# Patient Record
Sex: FEMALE | Race: BLACK OR AFRICAN AMERICAN | Employment: FULL TIME | URBAN - METROPOLITAN AREA
[De-identification: names, ages, dates, MRNs, and addresses within clinical notes are randomized per-mention and may not be internally consistent; named-entity substitution may affect disease eponyms.]

---

## 2019-11-11 ENCOUNTER — HOSPITAL ENCOUNTER (OUTPATIENT)
Dept: LAB | Age: 49
Discharge: HOME | End: 2019-11-11

## 2019-11-11 PROCEDURE — 86735 MUMPS ANTIBODY: CPT

## 2019-11-11 PROCEDURE — 86765 RUBEOLA ANTIBODY: CPT

## 2019-11-11 PROCEDURE — 86706 HEP B SURFACE ANTIBODY: CPT

## 2019-11-11 PROCEDURE — 86787 VARICELLA-ZOSTER ANTIBODY: CPT

## 2019-11-11 PROCEDURE — 86762 RUBELLA ANTIBODY: CPT

## 2019-11-12 LAB
HBV SURFACE AB SER QL IA: POSITIVE
HBV SURFACE AB SERPL IA-ACNC: 129.76 MIU/ML
HEP BS AB COMMENT,HBSAC: NORMAL
RUBV IGG SER-IMP: NORMAL

## 2019-11-13 LAB
MEV IGG SER IA-ACNC: >300 AU/ML
MUV IGG SER IA-ACNC: 56 AU/ML
VZV IGG SER IA-ACNC: 330 INDEX

## 2020-09-24 ENCOUNTER — EMPLOYEE WELLNESS (OUTPATIENT)
Dept: FAMILY MEDICINE CLINIC | Age: 50
End: 2020-09-24

## 2020-09-24 LAB
CHOLEST SERPL-MCNC: 180 MG/DL
GLUCOSE SERPL-MCNC: 95 MG/DL (ref 74–99)
HDLC SERPL-MCNC: 87 MG/DL (ref 40–60)
LDLC SERPL CALC-MCNC: 78.6 MG/DL (ref 0–100)
TRIGL SERPL-MCNC: 72 MG/DL (ref ?–150)

## 2020-10-08 ENCOUNTER — DOCUMENTATION ONLY (OUTPATIENT)
Dept: PULMONOLOGY | Age: 50
End: 2020-10-08

## 2020-12-03 ENCOUNTER — OFFICE VISIT (OUTPATIENT)
Dept: PULMONOLOGY | Age: 50
End: 2020-12-03
Payer: COMMERCIAL

## 2020-12-03 VITALS
OXYGEN SATURATION: 99 % | TEMPERATURE: 98.2 F | BODY MASS INDEX: 32.02 KG/M2 | SYSTOLIC BLOOD PRESSURE: 130 MMHG | RESPIRATION RATE: 19 BRPM | HEART RATE: 87 BPM | DIASTOLIC BLOOD PRESSURE: 80 MMHG | WEIGHT: 204 LBS | HEIGHT: 67 IN

## 2020-12-03 DIAGNOSIS — J30.9 ALLERGIC RHINITIS, UNSPECIFIED SEASONALITY, UNSPECIFIED TRIGGER: ICD-10-CM

## 2020-12-03 DIAGNOSIS — E66.9 OBESITY (BMI 30.0-34.9): ICD-10-CM

## 2020-12-03 DIAGNOSIS — Z88.6 ALLERGY TO NSAIDS: ICD-10-CM

## 2020-12-03 DIAGNOSIS — J45.30 MILD PERSISTENT ASTHMA WITHOUT COMPLICATION: Primary | ICD-10-CM

## 2020-12-03 PROCEDURE — 99204 OFFICE O/P NEW MOD 45 MIN: CPT | Performed by: INTERNAL MEDICINE

## 2020-12-03 PROCEDURE — 94010 BREATHING CAPACITY TEST: CPT | Performed by: INTERNAL MEDICINE

## 2020-12-03 RX ORDER — FLUTICASONE PROPIONATE AND SALMETEROL 500; 50 UG/1; UG/1
1 POWDER RESPIRATORY (INHALATION) EVERY 12 HOURS
Qty: 3 INHALER | Refills: 3 | Status: SHIPPED | COMMUNITY
Start: 2020-12-03 | End: 2022-04-20 | Stop reason: SDUPTHER

## 2020-12-03 RX ORDER — AZELASTINE 1 MG/ML
SPRAY, METERED NASAL
COMMUNITY
Start: 2020-08-28 | End: 2022-07-21

## 2020-12-03 RX ORDER — AMLODIPINE BESYLATE 10 MG/1
TABLET ORAL DAILY
COMMUNITY
Start: 2020-10-30

## 2020-12-03 RX ORDER — FLUTICASONE PROPIONATE 50 MCG
SPRAY, SUSPENSION (ML) NASAL
COMMUNITY
Start: 2020-08-28 | End: 2021-07-26 | Stop reason: SDUPTHER

## 2020-12-03 RX ORDER — ALBUTEROL SULFATE 90 UG/1
AEROSOL, METERED RESPIRATORY (INHALATION)
COMMUNITY
End: 2022-03-07 | Stop reason: SDUPTHER

## 2020-12-03 RX ORDER — FLUTICASONE PROPIONATE AND SALMETEROL 500; 50 UG/1; UG/1
1 POWDER RESPIRATORY (INHALATION) EVERY 12 HOURS
COMMUNITY
End: 2020-12-03 | Stop reason: SDUPTHER

## 2020-12-03 RX ORDER — LISINOPRIL 20 MG/1
TABLET ORAL DAILY
COMMUNITY
Start: 2020-10-30

## 2020-12-03 RX ORDER — ESTRIOL 100 %
POWDER (GRAM) MISCELLANEOUS
COMMUNITY
End: 2022-07-21

## 2020-12-03 NOTE — PROGRESS NOTES
HISTORY OF PRESENT ILLNESS  Josee Jaime is a 52 y.o. female referred to establish care for Asthma. Pt with long history of Asthma, well controlled on Wixela. She presents today to establish care for asthma as she recently moved from [facility]. She denies any ED visits or hospital admissions for asthma exacerbation in the past year. Last rescue inhaler use was over 12 months. Asthma triggers include pollen and other environmental factors, however pt also with NSAID allergies and NSAID induced asthma and allergic rhinitis. Pt is followed by ENT and is scheduled for allergy testing soon. Other symptoms include nasal and chest congestion. Pt recently had a severe episode of nasal congestion with production of malodorous nasal discharge, relieved with Prednisone written for Sciatica. Pt has intermittent pedal edema without orthopnea or PND. Review of Systems   Constitutional: Negative for chills, diaphoresis, fever, malaise/fatigue and weight loss. HENT: Positive for congestion and sinus pain. Negative for ear discharge, ear pain, hearing loss, nosebleeds, sore throat and tinnitus. Eyes: Negative for blurred vision, double vision, photophobia, pain, discharge and redness. Respiratory: Positive for cough. Negative for hemoptysis, sputum production and stridor. Shortness of breath: rare. Wheezing: rare. Cardiovascular: Positive for leg swelling. Negative for chest pain, palpitations, orthopnea, claudication and PND. Gastrointestinal: Negative for abdominal pain, blood in stool, constipation, diarrhea, heartburn, melena, nausea and vomiting. Genitourinary: Negative for dysuria, flank pain, frequency, hematuria and urgency. Musculoskeletal: Positive for back pain. Negative for falls, joint pain, myalgias and neck pain. Skin: Negative for itching and rash.    Neurological: Negative for dizziness, tingling, tremors, sensory change, speech change, focal weakness, seizures, loss of consciousness, weakness and headaches. Endo/Heme/Allergies: Negative for environmental allergies and polydipsia. Does not bruise/bleed easily. Psychiatric/Behavioral: Negative for depression, hallucinations, memory loss, substance abuse and suicidal ideas. The patient is not nervous/anxious and does not have insomnia. Past Medical History:   Diagnosis Date    Allergy to nonsteroidal anti-inflammatory drug (NSAID)     Asthma     Chronic sinusitis      Past Surgical History:   Procedure Laterality Date    HX CHOLECYSTECTOMY Bilateral 2018    HX HYSTERECTOMY       Current Outpatient Medications on File Prior to Visit   Medication Sig Dispense Refill    lisinopriL (PRINIVIL, ZESTRIL) 20 mg tablet       amLODIPine (NORVASC) 10 mg tablet       fluticasone propionate (FLONASE) 50 mcg/actuation nasal spray       azelastine (ASTELIN) 137 mcg (0.1 %) nasal spray       albuterol (PROVENTIL HFA, VENTOLIN HFA, PROAIR HFA) 90 mcg/actuation inhaler Take  by inhalation.  Estriol, Bulk, 100 % powd by Does Not Apply route. No current facility-administered medications on file prior to visit.       Allergies   Allergen Reactions    Aspirin Shortness of Breath     Other reaction(s): wheezing/sob    Nsaids (Non-Steroidal Anti-Inflammatory Drug) Shortness of Breath     Other reaction(s): anaphylaxis/angioedema     Family History   Problem Relation Age of Onset    No Known Problems Mother     No Known Problems Father      Social History     Socioeconomic History    Marital status: SINGLE     Spouse name: Not on file    Number of children: Not on file    Years of education: Not on file    Highest education level: Not on file   Occupational History    Occupation: CNA   Social Needs    Financial resource strain: Not on file    Food insecurity     Worry: Not on file     Inability: Not on file    Transportation needs     Medical: Not on file     Non-medical: Not on file   Tobacco Use    Smoking status: Never Smoker    Smokeless tobacco: Never Used   Substance and Sexual Activity    Alcohol use: Not on file    Drug use: Not on file    Sexual activity: Not on file   Lifestyle    Physical activity     Days per week: Not on file     Minutes per session: Not on file    Stress: Not on file   Relationships    Social connections     Talks on phone: Not on file     Gets together: Not on file     Attends Spiritism service: Not on file     Active member of club or organization: Not on file     Attends meetings of clubs or organizations: Not on file     Relationship status: Not on file    Intimate partner violence     Fear of current or ex partner: Not on file     Emotionally abused: Not on file     Physically abused: Not on file     Forced sexual activity: Not on file   Other Topics Concern    Not on file   Social History Narrative    Not on file     Blood pressure 130/80, pulse 87, temperature 98.2 °F (36.8 °C), temperature source Oral, resp. rate 19, height 5' 7\" (1.702 m), weight 92.5 kg (204 lb), SpO2 99 %. Physical Exam  Constitutional:       General: She is not in acute distress. Appearance: She is obese. She is not ill-appearing or toxic-appearing. HENT:      Head: Normocephalic and atraumatic. Right Ear: External ear normal.      Left Ear: External ear normal.      Nose:      Comments: Deferred      Mouth/Throat:      Comments: Deferred   Eyes:      General: No scleral icterus. Right eye: No discharge. Left eye: No discharge. Extraocular Movements: Extraocular movements intact. Conjunctiva/sclera: Conjunctivae normal.      Pupils: Pupils are equal, round, and reactive to light. Neck:      Musculoskeletal: No neck rigidity or muscular tenderness. Vascular: No carotid bruit. Cardiovascular:      Rate and Rhythm: Normal rate and regular rhythm. Pulses: Normal pulses. Heart sounds: Normal heart sounds. No murmur. No gallop.     Pulmonary:      Effort: Pulmonary effort is normal. No respiratory distress. Breath sounds: Normal breath sounds. No stridor. No wheezing, rhonchi or rales. Chest:      Chest wall: No tenderness. Abdominal:      General: There is no distension. Palpations: Abdomen is soft. There is no mass. Tenderness: There is no abdominal tenderness. Musculoskeletal:         General: No swelling, tenderness, deformity or signs of injury. Right lower leg: No edema. Left lower leg: No edema. Lymphadenopathy:      Cervical: No cervical adenopathy. Skin:     General: Skin is warm and dry. Coloration: Skin is not jaundiced or pale. Findings: No bruising, erythema, lesion or rash. Neurological:      General: No focal deficit present. Mental Status: She is alert and oriented to person, place, and time. Coordination: Coordination normal.   Psychiatric:         Mood and Affect: Mood normal.         Behavior: Behavior normal.         Thought Content: Thought content normal.         Judgment: Judgment normal.       Spirometry: normal flows and flow volume loop  ASSESSMENT and PLAN  Encounter Diagnoses   Name Primary?  Mild persistent asthma without complication Yes    Allergy to NSAIDs     Obesity (BMI 30.0-34. 9)     Allergic rhinitis, unspecified seasonality, unspecified trigger        Pt with well asthma well controlled on LABA/ICS. Presence of NSAID exacerbated respiratory disease (NERD) will need to be taken into account. Will continue Wixela and prn Albuterol. Pt instructed to continue Flonase and Azelastine as well as nasal saline flushes. Flu vaccine given outside. Allergen avoidance discussed.   RTC 6 months  Spirometry on return  Over 50% of this 45 minute visit was spent in face to face counseling

## 2020-12-03 NOTE — PROGRESS NOTES
Dao Thomson presents today for   Chief Complaint   Patient presents with    Asthma     Pt states she is establishing care. States she has allergies and has testing scheduled. Is someone accompanying this pt? No    Is the patient using any DME equipment during OV? No    -DME Company NA    Depression Screening:  3 most recent PHQ Screens 12/3/2020   Little interest or pleasure in doing things Not at all   Feeling down, depressed, irritable, or hopeless Not at all   Total Score PHQ 2 0       Learning Assessment:  No flowsheet data found. Abuse Screening:  No flowsheet data found. Fall Risk  No flowsheet data found. Coordination of Care:  1. Have you been to the ER, urgent care clinic since your last visit? Hospitalized since your last visit? No    2. Have you seen or consulted any other health care providers outside of the [address] [place] [address] since your last visit? Include any pap smears or colon screening.  No.

## 2021-07-26 ENCOUNTER — TELEPHONE (OUTPATIENT)
Dept: PULMONOLOGY | Age: 51
End: 2021-07-26

## 2021-07-26 RX ORDER — FLUTICASONE PROPIONATE 50 MCG
SPRAY, SUSPENSION (ML) NASAL
Qty: 1 BOTTLE | Refills: 1 | Status: CANCELLED | COMMUNITY
Start: 2021-07-26

## 2021-07-26 RX ORDER — FLUTICASONE PROPIONATE 50 MCG
2 SPRAY, SUSPENSION (ML) NASAL DAILY
Qty: 3 BOTTLE | Refills: 1 | Status: SHIPPED | COMMUNITY
Start: 2021-07-26 | End: 2022-07-25 | Stop reason: SDUPTHER

## 2021-07-26 NOTE — TELEPHONE ENCOUNTER
Pt called requesting a prescription for flonase be sent to [facility] pharmacy. Pt states that she usually gets it from her PCP but that they left the practice and they will not fill it until she is seen by another provider. Pt has an appt w/[facility] in September.  Please advise

## 2021-08-12 ENCOUNTER — OFFICE VISIT (OUTPATIENT)
Dept: PULMONOLOGY | Age: 51
End: 2021-08-12
Payer: COMMERCIAL

## 2021-08-12 VITALS — BODY MASS INDEX: 30.46 KG/M2 | HEIGHT: 67 IN | WEIGHT: 194.1 LBS

## 2021-08-12 DIAGNOSIS — J45.30 MILD PERSISTENT ASTHMA WITHOUT COMPLICATION: Primary | ICD-10-CM

## 2021-08-12 PROCEDURE — 94060 EVALUATION OF WHEEZING: CPT | Performed by: INTERNAL MEDICINE

## 2021-09-16 ENCOUNTER — OFFICE VISIT (OUTPATIENT)
Dept: PULMONOLOGY | Age: 51
End: 2021-09-16
Payer: COMMERCIAL

## 2021-09-16 VITALS
HEIGHT: 66 IN | SYSTOLIC BLOOD PRESSURE: 133 MMHG | RESPIRATION RATE: 16 BRPM | OXYGEN SATURATION: 97 % | TEMPERATURE: 98.5 F | HEART RATE: 89 BPM | BODY MASS INDEX: 31.98 KG/M2 | DIASTOLIC BLOOD PRESSURE: 72 MMHG | WEIGHT: 199 LBS

## 2021-09-16 DIAGNOSIS — J30.9 ALLERGIC RHINITIS, UNSPECIFIED SEASONALITY, UNSPECIFIED TRIGGER: ICD-10-CM

## 2021-09-16 DIAGNOSIS — E66.9 OBESITY (BMI 30.0-34.9): ICD-10-CM

## 2021-09-16 DIAGNOSIS — J45.20 MILD INTERMITTENT ASTHMA WITHOUT COMPLICATION: Primary | ICD-10-CM

## 2021-09-16 DIAGNOSIS — Z88.6 ALLERGY TO NSAIDS: ICD-10-CM

## 2021-09-16 PROCEDURE — 99214 OFFICE O/P EST MOD 30 MIN: CPT | Performed by: INTERNAL MEDICINE

## 2021-09-16 RX ORDER — PREDNISONE 20 MG/1
TABLET ORAL
COMMUNITY
Start: 2021-09-12 | End: 2022-07-21

## 2021-09-16 NOTE — PROGRESS NOTES
Ariella West presents today for   Chief Complaint   Patient presents with    Results     Judge Jacobo 8/12/21       Is someone accompanying this pt? No    Is the patient using any DME equipment during OV? No    -DME Company NA    Depression Screening:  3 most recent PHQ Screens 12/3/2020   Little interest or pleasure in doing things Not at all   Feeling down, depressed, irritable, or hopeless Not at all   Total Score PHQ 2 0       Learning Assessment:  Learning Assessment 9/16/2021   PRIMARY LEARNER Patient   PRIMARY LANGUAGE ENGLISH   LEARNER PREFERENCE PRIMARY DEMONSTRATION   RELATIONSHIP SELF       Abuse Screening:  No flowsheet data found. Fall Risk  No flowsheet data found. Coordination of Care:  1. Have you been to the ER, urgent care clinic since your last visit? Hospitalized since your last visit? 9/21 [facility]  Right foot pain     2. Have you seen or consulted any other health care providers outside of the [address] [place] [address] since your last visit? Include any pap smears or colon screening.  No

## 2021-09-16 NOTE — PROGRESS NOTES
HISTORY OF PRESENT ILLNESS  Heriberto Garcia is a 54 y.o. female following up for Asthma. Pt with long history of Asthma, well controlled on Wixela. She presents today to establish care for asthma as she recently moved from [facility]. She denies any ED visits or hospital admissions for asthma exacerbation in the past year. Last rescue inhaler use was over a year. Asthma triggers include pollen and other environmental factors, however pt also with NSAID allergies and NSAID induced asthma and allergic rhinitis. Pt is followed by ENT and uses Flonase and nasal saline flushes. Pt recently had a severe episode of nasal congestion with production of malodorous nasal discharge, relieved with Prednisone written for hell spurs and fasciitis. Pt has intermittent pedal edema without orthopnea or PND. Review of Systems   Constitutional: Negative for chills, diaphoresis, fever, malaise/fatigue and weight loss. HENT: Positive for congestion. Negative for ear discharge, ear pain, hearing loss, nosebleeds, sinus pain, sore throat and tinnitus. Eyes: Negative for blurred vision, double vision, photophobia, pain, discharge and redness. Respiratory: Positive for cough (minimal). Negative for hemoptysis, sputum production, wheezing and stridor. Cardiovascular: Positive for leg swelling (intertmittent). Negative for palpitations, orthopnea, claudication and PND. Gastrointestinal: Negative for blood in stool, constipation, diarrhea, heartburn, melena, nausea and vomiting. Genitourinary: Negative for dysuria, flank pain, frequency, hematuria and urgency. Musculoskeletal: Negative for back pain, falls, joint pain, myalgias and neck pain. Heel pains   Skin: Negative for itching and rash. Neurological: Negative for dizziness, tingling, tremors, sensory change, speech change, focal weakness, seizures, loss of consciousness and weakness.    Endo/Heme/Allergies: Negative for environmental allergies and polydipsia. Does not bruise/bleed easily. Psychiatric/Behavioral: Negative for depression, hallucinations, memory loss, substance abuse and suicidal ideas. The patient is not nervous/anxious and does not have insomnia. Past Medical History:   Diagnosis Date    Allergy to nonsteroidal anti-inflammatory drug (NSAID)     Asthma     Chronic sinusitis      Past Surgical History:   Procedure Laterality Date    HX CHOLECYSTECTOMY Bilateral 2018    HX HYSTERECTOMY       Current Outpatient Medications on File Prior to Visit   Medication Sig Dispense Refill    predniSONE (DELTASONE) 20 mg tablet       fluticasone propionate (FLONASE) 50 mcg/actuation nasal spray 2 Sprays by Both Nostrils route daily. 3 Bottle 1    lisinopriL (PRINIVIL, ZESTRIL) 20 mg tablet       amLODIPine (NORVASC) 10 mg tablet       azelastine (ASTELIN) 137 mcg (0.1 %) nasal spray       albuterol (PROVENTIL HFA, VENTOLIN HFA, PROAIR HFA) 90 mcg/actuation inhaler Take  by inhalation.  fluticasone propion-salmeteroL (Wixela Inhub) 500-50 mcg/dose diskus inhaler Take 1 Puff by inhalation every twelve (12) hours. 3 Inhaler 3    Estriol, Bulk, 100 % powd by Does Not Apply route. (Patient not taking: Reported on 9/16/2021)       No current facility-administered medications on file prior to visit.      Allergies   Allergen Reactions    Aspirin Shortness of Breath     Other reaction(s): wheezing/sob    Nsaids (Non-Steroidal Anti-Inflammatory Drug) Shortness of Breath     Other reaction(s): anaphylaxis/angioedema     Family History   Problem Relation Age of Onset    No Known Problems Mother     No Known Problems Father      Social History     Socioeconomic History    Marital status: SINGLE     Spouse name: Not on file    Number of children: Not on file    Years of education: Not on file    Highest education level: Not on file   Occupational History    Occupation: CNA   Tobacco Use    Smoking status: Never Smoker    Smokeless tobacco: Never Used   Substance and Sexual Activity    Alcohol use: Not on file    Drug use: Not on file    Sexual activity: Not on file   Other Topics Concern    Not on file   Social History Narrative    Not on file     Social Determinants of Health     Financial Resource Strain:     Difficulty of Paying Living Expenses:    Food Insecurity:     Worried About Running Out of Food in the Last Year:     [address] in the Last Year:    Transportation Needs:     Lack of Transportation (Medical):  Lack of Transportation (Non-Medical):    Physical Activity:     Days of Exercise per Week:     Minutes of Exercise per Session:    Stress:     Feeling of Stress :    Social Connections:     Frequency of Communication with Friends and Family:     Frequency of Social Gatherings with Friends and Family:     Attends [employer] Services:     Active Member of Clubs or Organizations:     Attends Club or Organization Meetings:     Marital Status:    Intimate Partner Violence:     Fear of Current or Ex-Partner:     Emotionally Abused:     Physically Abused:     Sexually Abused:      Blood pressure 133/72, pulse 89, temperature 98.5 °F (36.9 °C), temperature source Oral, resp. rate 16, height 5' 6\" (1.676 m), weight 90.3 kg (199 lb), SpO2 97 %. Physical Exam  Constitutional:       General: She is not in acute distress. Appearance: She is obese. She is not ill-appearing or toxic-appearing. HENT:      Head: Normocephalic and atraumatic. Right Ear: External ear normal.      Left Ear: External ear normal.      Nose:      Comments: Deferred      Mouth/Throat:      Comments: Deferred   Eyes:      General: No scleral icterus. Right eye: No discharge. Left eye: No discharge. Extraocular Movements: Extraocular movements intact. Conjunctiva/sclera: Conjunctivae normal.      Pupils: Pupils are equal, round, and reactive to light. Neck:      Vascular: No carotid bruit. Cardiovascular:      Rate and Rhythm: Normal rate and regular rhythm. Pulses: Normal pulses. Heart sounds: Normal heart sounds. No murmur heard. No gallop. Pulmonary:      Effort: Pulmonary effort is normal. No respiratory distress. Breath sounds: Normal breath sounds. No stridor. No wheezing, rhonchi or rales. Chest:      Chest wall: No tenderness. Abdominal:      General: There is no distension. Palpations: Abdomen is soft. There is no mass. Tenderness: There is no abdominal tenderness. Musculoskeletal:         General: No swelling, tenderness, deformity or signs of injury. Cervical back: No rigidity. No muscular tenderness. Right lower leg: No edema. Left lower leg: No edema. Lymphadenopathy:      Cervical: No cervical adenopathy. Skin:     General: Skin is warm and dry. Coloration: Skin is not jaundiced or pale. Findings: No bruising, erythema, lesion or rash. Neurological:      General: No focal deficit present. Mental Status: She is alert and oriented to person, place, and time. Coordination: Coordination normal.   Psychiatric:         Mood and Affect: Mood normal.         Behavior: Behavior normal.         Thought Content: Thought content normal.         Judgment: Judgment normal.       Spirometry: normal flows and flow volume loop    ASSESSMENT and PLAN  Encounter Diagnoses   Name Primary?  Mild intermittent asthma without complication Yes    Allergic rhinitis, unspecified seasonality, unspecified trigger     Allergy to NSAIDs     Obesity (BMI 30.0-34. 9)        Pt with well asthma well controlled on LABA/ICS. NSAID allergy complicates management of other problems but has otherwise not figured into her symptoms. Will continue Wixela and prn Albuterol. Pt instructed to continue Flonase and Azelastine as well as nasal saline flushes. Flu vaccine per PCP. Trigger  avoidance discussed.   RTC 6 months

## 2022-03-07 RX ORDER — ALBUTEROL SULFATE 90 UG/1
2 AEROSOL, METERED RESPIRATORY (INHALATION)
Qty: 1 EACH | Refills: 3 | Status: SHIPPED | COMMUNITY
Start: 2022-03-07

## 2022-03-07 NOTE — TELEPHONE ENCOUNTER
Pt [facility]([phone]). Pt needs script for Albuterol inhaler sent to [facility] [phone].

## 2022-03-24 ENCOUNTER — APPOINTMENT (OUTPATIENT)
Dept: GENERAL RADIOLOGY | Age: 52
End: 2022-03-24
Attending: STUDENT IN AN ORGANIZED HEALTH CARE EDUCATION/TRAINING PROGRAM
Payer: COMMERCIAL

## 2022-03-24 ENCOUNTER — HOSPITAL ENCOUNTER (EMERGENCY)
Age: 52
Discharge: HOME | End: 2022-03-25
Attending: STUDENT IN AN ORGANIZED HEALTH CARE EDUCATION/TRAINING PROGRAM
Payer: COMMERCIAL

## 2022-03-24 DIAGNOSIS — R07.9 RIGHT-SIDED CHEST PAIN: Primary | ICD-10-CM

## 2022-03-24 DIAGNOSIS — R00.2 PALPITATIONS: ICD-10-CM

## 2022-03-24 PROCEDURE — 85025 COMPLETE CBC W/AUTO DIFF WBC: CPT

## 2022-03-24 PROCEDURE — 80076 HEPATIC FUNCTION PANEL: CPT

## 2022-03-24 PROCEDURE — 85379 FIBRIN DEGRADATION QUANT: CPT

## 2022-03-24 PROCEDURE — 71045 X-RAY EXAM CHEST 1 VIEW: CPT

## 2022-03-24 PROCEDURE — 84484 ASSAY OF TROPONIN QUANT: CPT

## 2022-03-24 PROCEDURE — 84443 ASSAY THYROID STIM HORMONE: CPT

## 2022-03-24 PROCEDURE — 99285 EMERGENCY DEPT VISIT HI MDM: CPT

## 2022-03-24 PROCEDURE — 93005 ELECTROCARDIOGRAM TRACING: CPT

## 2022-03-24 PROCEDURE — 80048 BASIC METABOLIC PNL TOTAL CA: CPT

## 2022-03-25 ENCOUNTER — APPOINTMENT (OUTPATIENT)
Dept: CT IMAGING | Age: 52
End: 2022-03-25
Attending: PHYSICIAN ASSISTANT
Payer: COMMERCIAL

## 2022-03-25 VITALS
SYSTOLIC BLOOD PRESSURE: 131 MMHG | WEIGHT: 187 LBS | HEART RATE: 77 BPM | TEMPERATURE: 98.7 F | OXYGEN SATURATION: 100 % | BODY MASS INDEX: 29.35 KG/M2 | HEIGHT: 67 IN | DIASTOLIC BLOOD PRESSURE: 77 MMHG | RESPIRATION RATE: 16 BRPM

## 2022-03-25 LAB
ALBUMIN SERPL-MCNC: 3.9 G/DL (ref 3.4–5)
ALBUMIN/GLOB SERPL: 1 {RATIO} (ref 0.8–1.7)
ALP SERPL-CCNC: 80 U/L (ref 45–117)
ALT SERPL-CCNC: 25 U/L (ref 13–56)
ANION GAP SERPL CALC-SCNC: 6 MMOL/L (ref 3–18)
AST SERPL-CCNC: 17 U/L (ref 10–38)
ATRIAL RATE: 95 BPM
BASOPHILS # BLD: 0.1 K/UL (ref 0–0.1)
BASOPHILS NFR BLD: 1 % (ref 0–2)
BILIRUB DIRECT SERPL-MCNC: 0.2 MG/DL (ref 0–0.2)
BILIRUB SERPL-MCNC: 0.4 MG/DL (ref 0.2–1)
BUN SERPL-MCNC: 17 MG/DL (ref 7–18)
BUN/CREAT SERPL: 24 (ref 12–20)
CALCIUM SERPL-MCNC: 9.2 MG/DL (ref 8.5–10.1)
CALCULATED P AXIS, ECG09: 70 DEGREES
CALCULATED R AXIS, ECG10: 17 DEGREES
CALCULATED T AXIS, ECG11: 60 DEGREES
CHLORIDE SERPL-SCNC: 104 MMOL/L (ref 100–111)
CO2 SERPL-SCNC: 27 MMOL/L (ref 21–32)
CREAT SERPL-MCNC: 0.72 MG/DL (ref 0.6–1.3)
D DIMER PPP FEU-MCNC: 0.91 UG/ML(FEU)
DIAGNOSIS, 93000: NORMAL
DIFFERENTIAL METHOD BLD: ABNORMAL
EOSINOPHIL # BLD: 0.6 K/UL (ref 0–0.4)
EOSINOPHIL NFR BLD: 7 % (ref 0–5)
ERYTHROCYTE [DISTWIDTH] IN BLOOD BY AUTOMATED COUNT: 12.1 % (ref 11.6–14.5)
GLOBULIN SER CALC-MCNC: 3.8 G/DL (ref 2–4)
GLUCOSE SERPL-MCNC: 90 MG/DL (ref 74–99)
HCT VFR BLD AUTO: 39 % (ref 35–45)
HGB BLD-MCNC: 13.2 G/DL (ref 12–16)
IMM GRANULOCYTES # BLD AUTO: 0 K/UL (ref 0–0.04)
IMM GRANULOCYTES NFR BLD AUTO: 0 % (ref 0–0.5)
LYMPHOCYTES # BLD: 2.8 K/UL (ref 0.9–3.6)
LYMPHOCYTES NFR BLD: 36 % (ref 21–52)
MCH RBC QN AUTO: 30.6 PG (ref 24–34)
MCHC RBC AUTO-ENTMCNC: 33.8 G/DL (ref 31–37)
MCV RBC AUTO: 90.3 FL (ref 78–100)
MONOCYTES # BLD: 1.2 K/UL (ref 0.05–1.2)
MONOCYTES NFR BLD: 16 % (ref 3–10)
NEUTS SEG # BLD: 3 K/UL (ref 1.8–8)
NEUTS SEG NFR BLD: 40 % (ref 40–73)
NRBC # BLD: 0 K/UL (ref 0–0.01)
NRBC BLD-RTO: 0 PER 100 WBC
P-R INTERVAL, ECG05: 160 MS
PLATELET # BLD AUTO: 351 K/UL (ref 135–420)
PMV BLD AUTO: 9.1 FL (ref 9.2–11.8)
POTASSIUM SERPL-SCNC: 3.6 MMOL/L (ref 3.5–5.5)
PROT SERPL-MCNC: 7.7 G/DL (ref 6.4–8.2)
Q-T INTERVAL, ECG07: 338 MS
QRS DURATION, ECG06: 70 MS
QTC CALCULATION (BEZET), ECG08: 424 MS
RBC # BLD AUTO: 4.32 M/UL (ref 4.2–5.3)
SODIUM SERPL-SCNC: 137 MMOL/L (ref 136–145)
TROPONIN-HIGH SENSITIVITY: 3 NG/L (ref 0–54)
TROPONIN-HIGH SENSITIVITY: <3 NG/L (ref 0–54)
TSH SERPL DL<=0.05 MIU/L-ACNC: 1.73 UIU/ML (ref 0.36–3.74)
VENTRICULAR RATE, ECG03: 95 BPM
WBC # BLD AUTO: 7.7 K/UL (ref 4.6–13.2)

## 2022-03-25 PROCEDURE — 74011000636 HC RX REV CODE- 636: Performed by: STUDENT IN AN ORGANIZED HEALTH CARE EDUCATION/TRAINING PROGRAM

## 2022-03-25 PROCEDURE — 84484 ASSAY OF TROPONIN QUANT: CPT

## 2022-03-25 PROCEDURE — 71275 CT ANGIOGRAPHY CHEST: CPT

## 2022-03-25 RX ADMIN — IOPAMIDOL 100 ML: 755 INJECTION, SOLUTION INTRAVENOUS at 02:33

## 2022-03-25 NOTE — DISCHARGE INSTRUCTIONS
[employer] Activation    Thank you for requesting access to [facility]. Please follow the instructions below to securely access and download your online medical record. [facility] allows you to send messages to your doctor, view your test results, renew your prescriptions, schedule appointments, and more. How Do I Sign Up? In your internet browser, go to [email or web address]  Click on the First Time User? Click Here link in the Sign In box. You will be redirect to the New Member Sign Up page. Enter your [facility] Access Code exactly as it appears below. You will not need to use this code after youve completed the sign-up process. If you do not sign up before the expiration date, you must request a new code. [facility] Access Code: Activation code not generated  Current [facility] Status: Active (This is the date your [facility] access code will )    Enter the last four digits of your Social Security Number (xxxx) and Date of Birth (mm/dd/yyyy) as indicated and click Submit. You will be taken to the next sign-up page. Create a [facility] ID. This will be your [facility] login ID and cannot be changed, so think of one that is secure and easy to remember. Create a [facility] password. You can change your password at any time. Enter your Password Reset Question and Answer. This can be used at a later time if you forget your password. Enter your e-mail address. You will receive e-mail notification when new information is available in [address] Click Sign Up. You can now view and download portions of your medical record. Click the [place] link to download a portable copy of your medical information. Additional Information    If you have questions, please visit the Frequently Asked Questions section of the [facility] website at [email or web address] Remember, [facility] is NOT to be used for urgent needs. For medical emergencies, dial 911.

## 2022-03-25 NOTE — ED NOTES
Patient signed out to me pending repeat troponin and CTA. Repeat troponin is stable at undetectable levels. CTA shows no signs of any pulmonary embolism or other acute process at this time. Upon reexamination patient states the pain she was feeling has improved and she denies any more recurrent palpitations. She is otherwise hemodynamic stable and clinically appears well. Heart exam within normal limits. Patient stable for discharge at this time. Patient is in agreement with the plan to be discharged at this time. All the patient's questions were answered. Patient was given written instructions on the diagnosis, and states understanding of the plan moving forward. We did discuss important signs and symptoms that should prompt quick return to the emergency department. Disposition: Patient was discharged home in stable condition.   They will follow up with cardiology    Prescriptions: None    Diagnosis:Acute chest pain, no diagnosis  Acute palpitations

## 2022-03-25 NOTE — ED PROVIDER NOTES
EMERGENCY DEPARTMENT HISTORY AND PHYSICAL EXAM    Date: 3/24/2022  Patient Name: Michelle Lopez    History of Presenting Illness     Chief Complaint   Patient presents with    Chest Pain    Palpitations         History Provided By: Patient    Chief Complaint: chest pain and palpitations  Duration: 10 pm tonight  Timing:  acute  Location: R side of the chest  Quality: soreness, fluttering   Severity:moderate  Modifying Factors:none   Associated Symptoms: none       Additional History (Context): Michelle Lopez is a 50 y.o. female with PMH asthma and htn  who presents to the ED for evaluation for chest pain and intermittent palpitations that began around 10 PM tonight. 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 She states that around 10 PM she began to feel some soreness to the R side of her chest. She reports using her albuterol inhaler which provided no relief. Since then she has developed intermittent palpitations and an elevated HR up 112. Denies leg swelling, SOB, fever, chills, and abd complaints. Pt states she has been compliant with all of her daily meds. Denies any hx of recent trauma. No other complaints reported at this time. PCP: Lm Gerer MD    Current Outpatient Medications   Medication Sig Dispense Refill    albuterol (PROVENTIL HFA, VENTOLIN HFA, PROAIR HFA) 90 mcg/actuation inhaler Take 2 Puffs by inhalation every four (4) hours as needed for Wheezing. 1 Each 3    predniSONE (DELTASONE) 20 mg tablet       fluticasone propionate (FLONASE) 50 mcg/actuation nasal spray 2 Sprays by Both Nostrils route daily. 3 Bottle 1    lisinopriL (PRINIVIL, ZESTRIL) 20 mg tablet       amLODIPine (NORVASC) 10 mg tablet       azelastine (ASTELIN) 137 mcg (0.1 %) nasal spray       Estriol, Bulk, 100 % powd by Does Not Apply route.  (Patient not taking: Reported on 9/16/2021)      fluticasone propion-salmeteroL (Wixela Inhub) 500-50 mcg/dose diskus inhaler Take 1 Puff by inhalation every twelve (12) hours. 3 Inhaler 3       Past History     Past Medical History:  Past Medical History:   Diagnosis Date    Allergy to nonsteroidal anti-inflammatory drug (NSAID)     Asthma     Chronic sinusitis        Past Surgical History:  Past Surgical History:   Procedure Laterality Date    HX CHOLECYSTECTOMY Bilateral 2018    HX HYSTERECTOMY         Family History:  Family History   Problem Relation Age of Onset    No Known Problems Mother     No Known Problems Father        Social History:  Social History     Tobacco Use    Smoking status: Never Smoker    Smokeless tobacco: Never Used   Substance Use Topics    Alcohol use: Not on file    Drug use: Not on file       Allergies: Allergies   Allergen Reactions    Aspirin Shortness of Breath     Other reaction(s): wheezing/sob    Nsaids (Non-Steroidal Anti-Inflammatory Drug) Shortness of Breath     Other reaction(s): anaphylaxis/angioedema         Review of Systems   Review of Systems   Constitutional: Negative. Negative for chills and fever. HENT: Negative. Negative for congestion, ear pain and rhinorrhea. Eyes: Negative. Negative for pain and redness. Respiratory: Negative. Negative for cough, shortness of breath, wheezing and stridor. Cardiovascular: Positive for chest pain and palpitations. Negative for leg swelling. Gastrointestinal: Negative. Negative for abdominal pain, constipation, diarrhea, nausea and vomiting. Genitourinary: Negative. Negative for dysuria and frequency. Musculoskeletal: Negative. Negative for back pain and neck pain. Skin: Negative. Negative for rash and wound. Neurological: Negative. Negative for dizziness, seizures, syncope and headaches. All other systems reviewed and are negative.     All Other Systems Negative  Physical Exam     Vitals:    03/24/22 2328 03/25/22 0006   BP: (!) 164/94 (!) 144/82   Pulse: 94 96   Resp: 18 21   Temp: 98.7 °F (37.1 °C)    SpO2: 100% 100%   Weight: 84.8 kg (187 lb) Height: 5' 7\" (1.702 m)      Physical Exam  Vitals and nursing note reviewed. Constitutional:       General: She is not in acute distress. Appearance: She is well-developed. She is not diaphoretic. HENT:      Head: Normocephalic and atraumatic. Eyes:      General: No scleral icterus. Right eye: No discharge. Left eye: No discharge. Conjunctiva/sclera: Conjunctivae normal.   Cardiovascular:      Rate and Rhythm: Normal rate and regular rhythm. Heart sounds: Normal heart sounds. No murmur heard. No friction rub. No gallop. Pulmonary:      Effort: Pulmonary effort is normal. No respiratory distress. Breath sounds: Normal breath sounds. No stridor. No wheezing, rhonchi or rales. Chest:      Chest wall: No tenderness. Musculoskeletal:         General: Normal range of motion. Cervical back: Normal range of motion and neck supple. Skin:     General: Skin is warm and dry. Findings: No erythema or rash. Neurological:      General: No focal deficit present. Mental Status: She is alert and oriented to person, place, and time. Mental status is at baseline. Coordination: Coordination normal.      Comments: Gait is steady and patient exhibits no evidence of ataxia. Patient is able to ambulate without difficulty. No focal neurological deficit noted. No facial droop, slurred speech, or evidence of altered mentation noted on exam.       Psychiatric:         Behavior: Behavior normal.         Thought Content:  Thought content normal.                Diagnostic Study Results     Labs -     Recent Results (from the past 12 hour(s))   METABOLIC PANEL, BASIC    Collection Time: 03/24/22 11:31 PM   Result Value Ref Range    Sodium 137 136 - 145 mmol/L    Potassium 3.6 3.5 - 5.5 mmol/L    Chloride 104 100 - 111 mmol/L    CO2 27 21 - 32 mmol/L    Anion gap 6 3.0 - 18 mmol/L    Glucose 90 74 - 99 mg/dL    BUN 17 7.0 - 18 MG/DL    Creatinine 0.72 0.6 - 1.3 MG/DL BUN/Creatinine ratio 24 (H) 12 - 20      GFR est AA >60 >60 ml/min/1.73m2    GFR est non-AA >60 >60 ml/min/1.73m2    Calcium 9.2 8.5 - 10.1 MG/DL   CBC WITH AUTOMATED DIFF    Collection Time: 03/24/22 11:31 PM   Result Value Ref Range    WBC 7.7 4.6 - 13.2 K/uL    RBC 4.32 4.20 - 5.30 M/uL    HGB 13.2 12.0 - 16.0 g/dL    HCT 39.0 35.0 - 45.0 %    MCV 90.3 78.0 - 100.0 FL    MCH 30.6 24.0 - 34.0 PG    MCHC 33.8 31.0 - 37.0 g/dL    RDW 12.1 11.6 - 14.5 %    PLATELET 809 117 - 687 K/uL    MPV 9.1 (L) 9.2 - 11.8 FL    NRBC 0.0 0  WBC    ABSOLUTE NRBC 0.00 0.00 - 0.01 K/uL    NEUTROPHILS 40 40 - 73 %    LYMPHOCYTES 36 21 - 52 %    MONOCYTES 16 (H) 3 - 10 %    EOSINOPHILS 7 (H) 0 - 5 %    BASOPHILS 1 0 - 2 %    IMMATURE GRANULOCYTES 0 0.0 - 0.5 %    ABS. NEUTROPHILS 3.0 1.8 - 8.0 K/UL    ABS. LYMPHOCYTES 2.8 0.9 - 3.6 K/UL    ABS. MONOCYTES 1.2 0.05 - 1.2 K/UL    ABS. EOSINOPHILS 0.6 (H) 0.0 - 0.4 K/UL    ABS. BASOPHILS 0.1 0.0 - 0.1 K/UL    ABS. IMM. GRANS. 0.0 0.00 - 0.04 K/UL    DF AUTOMATED     TROPONIN-HIGH SENSITIVITY    Collection Time: 03/24/22 11:31 PM   Result Value Ref Range    Troponin-High Sensitivity 3 0 - 54 ng/L   HEPATIC FUNCTION PANEL    Collection Time: 03/24/22 11:31 PM   Result Value Ref Range    Protein, total 7.7 6.4 - 8.2 g/dL    Albumin 3.9 3.4 - 5.0 g/dL    Globulin 3.8 2.0 - 4.0 g/dL    A-G Ratio 1.0 0.8 - 1.7      Bilirubin, total 0.4 0.2 - 1.0 MG/DL    Bilirubin, direct 0.2 0.0 - 0.2 MG/DL    Alk.  phosphatase 80 45 - 117 U/L    AST (SGOT) 17 10 - 38 U/L    ALT (SGPT) 25 13 - 56 U/L   D DIMER    Collection Time: 03/24/22 11:31 PM   Result Value Ref Range    D DIMER 0.91 (H) <0.46 ug/ml(FEU)   TSH 3RD GENERATION    Collection Time: 03/24/22 11:31 PM   Result Value Ref Range    TSH 1.73 0.36 - 3.74 uIU/mL   EKG, 12 LEAD, INITIAL    Collection Time: 03/24/22 11:36 PM   Result Value Ref Range    Ventricular Rate 95 BPM    Atrial Rate 95 BPM    P-R Interval 160 ms    QRS Duration 70 ms    Q-T Interval 338 ms    QTC Calculation (Bezet) 424 ms    Calculated P Axis 70 degrees    Calculated R Axis 17 degrees    Calculated T Axis 60 degrees    Diagnosis       Normal sinus rhythm  Cannot rule out Anterior infarct , age undetermined  Abnormal ECG  No previous ECGs available         Radiologic Studies -   XR CHEST PORT    (Results Pending)   CTA CHEST W OR W WO CONT    (Results Pending)     CT Results  (Last 48 hours)    None        CXR Results  (Last 48 hours)    None            Medical Decision Making   I am the first provider for this patient. I reviewed the vital signs, available nursing notes, past medical history, past surgical history, family history and social history. Vital Signs-Reviewed the patient's vital signs. Records Reviewed: Kisha Anderson PA-C     Procedures:  Procedures    Provider Notes (Medical Decision Making): Impression:  Palpitations, R sided chest pain    EKG normal sinus, rate 95, no STEMI, no prior study available for comparison, reviewed by myself and the ED attending     Labs: d dimer 0.91, troponin and TSH normal, CBC unremarkable     Chest x-ray negative for acute process      CTA chest ordered, will also plan to trend the troponin in the ED.     2:00 AM Pt is turned over to Dr. Suman Quintanilla, night ED attending who agrees to assume care of this pt at this time. Discussed this case with the doctor who agrees with the plan to dispo the pt pending results of labs/imaging. Kisha Anderson PA-C     MED RECONCILIATION:  No current facility-administered medications for this encounter. Current Outpatient Medications   Medication Sig    albuterol (PROVENTIL HFA, VENTOLIN HFA, PROAIR HFA) 90 mcg/actuation inhaler Take 2 Puffs by inhalation every four (4) hours as needed for Wheezing.  predniSONE (DELTASONE) 20 mg tablet     fluticasone propionate (FLONASE) 50 mcg/actuation nasal spray 2 Sprays by Both Nostrils route daily.     lisinopriL (PRINIVIL, ZESTRIL) 20 mg tablet  amLODIPine (NORVASC) 10 mg tablet     azelastine (ASTELIN) 137 mcg (0.1 %) nasal spray     Estriol, Bulk, 100 % powd by Does Not Apply route. (Patient not taking: Reported on 9/16/2021)    fluticasone propion-salmeteroL (Wixela Inhub) 500-50 mcg/dose diskus inhaler Take 1 Puff by inhalation every twelve (12) hours. Disposition:  TBD     DISCHARGE NOTE:     Follow-up Information     Follow up With Specialties Details Why Contact Info    Lm Greer MD Family Medicine In 2 days  [address]  [address] Larry Oneill MD Cardiology In 1 week  [address] [place] [address] [address] [facility] EMERGENCY DEPT Emergency Medicine  As needed [address]  [phone]          Current Discharge Medication List              Diagnosis     Clinical Impression:   1. Right-sided chest pain    2.  Palpitations

## 2022-03-25 NOTE — ED TRIAGE NOTES
Pt arrives ambulatory to triage from [employer] where she was working. States for the past hour she has been experiencing R sided \"dull, ache\" chest pain with palpitations with -120. HR 94 in triage. Rates pain/discomfort 3/10. Reports she took 2 puffs of inhaler. Denies SOB/N/V/D. Denies cough/cold/fever/chills. Reports feeling tired. EKG ordered.      Past Medical History:   Diagnosis Date    Allergy to nonsteroidal anti-inflammatory drug (NSAID)     Asthma     Chronic sinusitis

## 2022-03-25 NOTE — ED NOTES
Pt presents to room 18 after completion of protocol EKG, chest xray, PIV initiation with labs. 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 Attempted to alleviate symptoms by using asthma inhaler- this did not impact symptoms in any way. Presented to ED for triage where HR was 110-120. HR at this time is in the 90's. Denies N/V, SOB. Pt also denies recent illness or history of similar symptoms. Pt has hx of HTN with last BP check with PCP last week, no med changes at that time. Attached to monitors, VSS with no need for immediate interventions. Labs and xrays pending. Call bell secured within reach.

## 2022-03-28 ENCOUNTER — PATIENT OUTREACH (OUTPATIENT)
Dept: OTHER | Age: 52
End: 2022-03-28

## 2022-04-20 ENCOUNTER — OFFICE VISIT (OUTPATIENT)
Dept: PULMONOLOGY | Age: 52
End: 2022-04-20
Payer: COMMERCIAL

## 2022-04-20 VITALS
RESPIRATION RATE: 18 BRPM | BODY MASS INDEX: 28.66 KG/M2 | OXYGEN SATURATION: 98 % | TEMPERATURE: 97.4 F | HEART RATE: 73 BPM | HEIGHT: 67 IN | WEIGHT: 182.6 LBS | SYSTOLIC BLOOD PRESSURE: 125 MMHG | DIASTOLIC BLOOD PRESSURE: 75 MMHG

## 2022-04-20 DIAGNOSIS — J45.30 MILD PERSISTENT ASTHMA WITHOUT COMPLICATION: Primary | ICD-10-CM

## 2022-04-20 DIAGNOSIS — J30.9 ALLERGIC RHINITIS, UNSPECIFIED SEASONALITY, UNSPECIFIED TRIGGER: ICD-10-CM

## 2022-04-20 PROBLEM — E66.9 OBESITY: Status: ACTIVE | Noted: 2022-04-20

## 2022-04-20 PROBLEM — J45.901 ASTHMA WITH ACUTE EXACERBATION: Status: ACTIVE | Noted: 2017-01-31

## 2022-04-20 PROBLEM — E55.9 VITAMIN D DEFICIENCY: Status: ACTIVE | Noted: 2018-07-05

## 2022-04-20 PROBLEM — J30.1 ALLERGIC RHINITIS DUE TO POLLEN: Status: ACTIVE | Noted: 2020-12-23

## 2022-04-20 PROBLEM — M25.519 SHOULDER JOINT PAIN: Status: ACTIVE | Noted: 2022-04-20

## 2022-04-20 PROCEDURE — 99213 OFFICE O/P EST LOW 20 MIN: CPT | Performed by: INTERNAL MEDICINE

## 2022-04-20 RX ORDER — HYDROCHLOROTHIAZIDE 12.5 MG/1
CAPSULE ORAL
COMMUNITY
Start: 2022-03-24 | End: 2022-07-21

## 2022-04-20 RX ORDER — FLUTICASONE PROPIONATE AND SALMETEROL 500; 50 UG/1; UG/1
1 POWDER RESPIRATORY (INHALATION) EVERY 12 HOURS
Qty: 3 EACH | Refills: 3 | Status: SHIPPED | COMMUNITY
Start: 2022-04-20 | End: 2022-07-21

## 2022-04-20 NOTE — PROGRESS NOTES
Jean Almazan presents today for   Chief Complaint   Patient presents with    Follow-up       Is someone accompanying this pt? no    Is the patient using any DME equipment during OV? no    -DME Company n/a    Depression Screening:  3 most recent PHQ Screens 4/20/2022   Little interest or pleasure in doing things Not at all   Feeling down, depressed, irritable, or hopeless Not at all   Total Score PHQ 2 0       Learning Assessment:  Learning Assessment 9/16/2021   PRIMARY LEARNER Patient   PRIMARY LANGUAGE ENGLISH   LEARNER PREFERENCE PRIMARY DEMONSTRATION   RELATIONSHIP SELF       Abuse Screening:  No flowsheet data found. Fall Risk  No flowsheet data found. Coordination of Care:  1. Have you been to the ER, urgent care clinic since your last visit? Hospitalized since your last visit? Yes 3/24/2022 [facility] (r) sided chest pain    2. Have you seen or consulted any other health care providers outside of the [address] [place] [address] since your last visit? Include any pap smears or colon screening. no

## 2022-04-20 NOTE — PROGRESS NOTES
HISTORY OF PRESENT ILLNESS  Trip Castro is a 50 y.o. female following up for Asthma. Pt with long history of Asthma, well controlled on Wixela. She denies any ED visits or hospital admissions for Asthma exacerbation. She was seen in the ED for palpitations and R sided chest pain which was apparently caused by dehydration. Asthma triggers include pollen and other environmental factors, however pt also with NSAID allergies and NSAID induced asthma and allergic rhinitis. Pt is followed by ENT and uses Flonase and nasal saline flushes. Pt has intermittent pedal edema without orthopnea or PND. Review of Systems   Constitutional: Negative for chills, diaphoresis, fever, malaise/fatigue and weight loss. HENT: Positive for congestion. Negative for ear discharge, ear pain, hearing loss, nosebleeds, sinus pain, sore throat and tinnitus. Eyes: Negative for blurred vision, double vision, photophobia, pain, discharge and redness. Respiratory: Positive for cough (minimal). Negative for hemoptysis, sputum production, shortness of breath, wheezing and stridor. Cardiovascular: Positive for palpitations (resolved) and leg swelling (intertmittent). Negative for chest pain, orthopnea, claudication and PND. Gastrointestinal: Negative for abdominal pain, blood in stool, constipation, diarrhea, heartburn, melena, nausea and vomiting. Genitourinary: Negative for dysuria, flank pain, frequency, hematuria and urgency. Musculoskeletal: Negative for back pain, falls, joint pain, myalgias and neck pain. Heel pains   Skin: Negative for itching and rash. Neurological: Negative for dizziness, tingling, tremors, sensory change, speech change, focal weakness, seizures, loss of consciousness, weakness and headaches. Endo/Heme/Allergies: Negative for environmental allergies and polydipsia. Does not bruise/bleed easily.    Psychiatric/Behavioral: Negative for depression, hallucinations, memory loss, substance abuse and suicidal ideas. The patient is not nervous/anxious and does not have insomnia. Past Medical History:   Diagnosis Date    Allergy to nonsteroidal anti-inflammatory drug (NSAID)     Asthma     Chronic sinusitis      Past Surgical History:   Procedure Laterality Date    HX CHOLECYSTECTOMY Bilateral 2018    HX HYSTERECTOMY       Current Outpatient Medications on File Prior to Visit   Medication Sig Dispense Refill    hydroCHLOROthiazide (MICROZIDE) 12.5 mg capsule       albuterol (PROVENTIL HFA, VENTOLIN HFA, PROAIR HFA) 90 mcg/actuation inhaler Take 2 Puffs by inhalation every four (4) hours as needed for Wheezing. 1 Each 3    predniSONE (DELTASONE) 20 mg tablet       fluticasone propionate (FLONASE) 50 mcg/actuation nasal spray 2 Sprays by Both Nostrils route daily. 3 Bottle 1    lisinopriL (PRINIVIL, ZESTRIL) 20 mg tablet       amLODIPine (NORVASC) 10 mg tablet       azelastine (ASTELIN) 137 mcg (0.1 %) nasal spray       Estriol, Bulk, 100 % powd by Does Not Apply route. (Patient not taking: Reported on 9/16/2021)       No current facility-administered medications on file prior to visit.      Allergies   Allergen Reactions    Aspirin Shortness of Breath     Other reaction(s): wheezing/sob    Nsaids (Non-Steroidal Anti-Inflammatory Drug) Shortness of Breath     Other reaction(s): anaphylaxis/angioedema     Family History   Problem Relation Age of Onset    No Known Problems Mother     No Known Problems Father      Social History     Socioeconomic History    Marital status: SINGLE     Spouse name: Not on file    Number of children: Not on file    Years of education: Not on file    Highest education level: Not on file   Occupational History    Occupation: CNA   Tobacco Use    Smoking status: Never Smoker    Smokeless tobacco: Never Used   Substance and Sexual Activity    Alcohol use: Not on file    Drug use: Not on file    Sexual activity: Not on file   Other Topics Concern    Not on file   Social History Narrative    Not on file     Social Determinants of Health     Financial Resource Strain:     Difficulty of Paying Living Expenses: Not on file   Food Insecurity:     Worried About Running Out of Food in the Last Year: Not on file    Rosalie of Food in the Last Year: Not on file   Transportation Needs:     Lack of Transportation (Medical): Not on file    Lack of Transportation (Non-Medical): Not on file   Physical Activity:     Days of Exercise per Week: Not on file    Minutes of Exercise per Session: Not on file   Stress:     Feeling of Stress : Not on file   Social Connections:     Frequency of Communication with Friends and Family: Not on file    Frequency of Social Gatherings with Friends and Family: Not on file    Attends [employer] Services: Not on file    Active Member of [address] [place] [address] [employer] or Organizations: Not on file    Attends Club or Organization Meetings: Not on file    Marital Status: Not on file   Intimate Partner Violence:     Fear of Current or Ex-Partner: Not on file    Emotionally Abused: Not on file    Physically Abused: Not on file    Sexually Abused: Not on file   Housing Stability:     Unable to Pay for Housing in the Last Year: Not on file    Number of [place] in the Last Year: Not on file    Unstable Housing in the Last Year: Not on file     Blood pressure 125/75, pulse 73, temperature 97.4 °F (36.3 °C), temperature source Temporal, resp. rate 18, height 5' 7\" (1.702 m), weight 82.8 kg (182 lb 9.6 oz), SpO2 98 %. Physical Exam  Constitutional:       General: She is not in acute distress. Appearance: She is obese. She is not ill-appearing or toxic-appearing. HENT:      Head: Normocephalic and atraumatic. Right Ear: External ear normal.      Left Ear: External ear normal.      Nose:      Comments: Deferred      Mouth/Throat:      Comments: Deferred   Eyes:      General: No scleral icterus. Right eye: No discharge.          Left eye: No discharge. Extraocular Movements: Extraocular movements intact. Conjunctiva/sclera: Conjunctivae normal.      Pupils: Pupils are equal, round, and reactive to light. Neck:      Vascular: No carotid bruit. Cardiovascular:      Rate and Rhythm: Normal rate and regular rhythm. Pulses: Normal pulses. Heart sounds: Normal heart sounds. No murmur heard. No gallop. Pulmonary:      Effort: Pulmonary effort is normal. No respiratory distress. Breath sounds: Normal breath sounds. No stridor. No wheezing, rhonchi or rales. Chest:      Chest wall: No tenderness. Abdominal:      General: There is no distension. Palpations: Abdomen is soft. There is no mass. Tenderness: There is no abdominal tenderness. Musculoskeletal:         General: No swelling, tenderness, deformity or signs of injury. Cervical back: No rigidity or tenderness. No muscular tenderness. Right lower leg: No edema. Left lower leg: No edema. Lymphadenopathy:      Cervical: No cervical adenopathy. Skin:     General: Skin is warm and dry. Coloration: Skin is not jaundiced or pale. Findings: No bruising, erythema, lesion or rash. Neurological:      General: No focal deficit present. Mental Status: She is alert and oriented to person, place, and time. Coordination: Coordination normal.   Psychiatric:         Mood and Affect: Mood normal.         Behavior: Behavior normal.         Thought Content: Thought content normal.         Judgment: Judgment normal.       Spirometry: normal flows and flow volume loop    ASSESSMENT and PLAN  Encounter Diagnoses   Name Primary?  Mild persistent asthma without complication Yes    Allergic rhinitis, unspecified seasonality, unspecified trigger        Pt with well asthma well controlled on LABA/ICS. NSAID allergy complicates management of other problems but has otherwise not figured into her symptoms.   Will continue Advair and prn Albuterol. Refill sent for Advair  Continue Flonase and Azelastine as well as nasal saline flushes. Trigger  avoidance discussed.   RTC 6 months   Lillian Loza on next visit

## 2022-07-25 RX ORDER — FLUTICASONE PROPIONATE 50 MCG
2 SPRAY, SUSPENSION (ML) NASAL DAILY
Qty: 3 EACH | Refills: 1 | Status: SHIPPED | OUTPATIENT
Start: 2022-07-25

## 2022-09-16 ENCOUNTER — OFFICE VISIT (OUTPATIENT)
Dept: PULMONOLOGY | Age: 52
End: 2022-09-16
Payer: COMMERCIAL

## 2022-09-16 VITALS — HEIGHT: 67 IN | WEIGHT: 176 LBS | BODY MASS INDEX: 27.62 KG/M2

## 2022-09-16 DIAGNOSIS — J45.30 MILD PERSISTENT ASTHMA WITHOUT COMPLICATION: ICD-10-CM

## 2022-09-16 DIAGNOSIS — J45.20 MILD INTERMITTENT ASTHMA WITHOUT COMPLICATION: Primary | ICD-10-CM

## 2022-09-16 PROCEDURE — 94060 EVALUATION OF WHEEZING: CPT | Performed by: INTERNAL MEDICINE

## 2022-11-15 ENCOUNTER — OFFICE VISIT (OUTPATIENT)
Dept: PULMONOLOGY | Age: 52
End: 2022-11-15
Payer: COMMERCIAL

## 2022-11-15 VITALS
HEART RATE: 85 BPM | RESPIRATION RATE: 18 BRPM | BODY MASS INDEX: 28.35 KG/M2 | WEIGHT: 180.6 LBS | HEIGHT: 67 IN | OXYGEN SATURATION: 100 % | TEMPERATURE: 97.7 F | DIASTOLIC BLOOD PRESSURE: 85 MMHG | SYSTOLIC BLOOD PRESSURE: 141 MMHG

## 2022-11-15 DIAGNOSIS — J45.30 MILD PERSISTENT ASTHMA WITHOUT COMPLICATION: Primary | ICD-10-CM

## 2022-11-15 DIAGNOSIS — J18.9 COMMUNITY ACQUIRED PNEUMONIA, UNSPECIFIED LATERALITY: ICD-10-CM

## 2022-11-15 DIAGNOSIS — Z91.09 ENVIRONMENTAL ALLERGIES: ICD-10-CM

## 2022-11-15 PROCEDURE — 99214 OFFICE O/P EST MOD 30 MIN: CPT | Performed by: INTERNAL MEDICINE

## 2022-11-15 PROCEDURE — 3074F SYST BP LT 130 MM HG: CPT | Performed by: INTERNAL MEDICINE

## 2022-11-15 PROCEDURE — 3078F DIAST BP <80 MM HG: CPT | Performed by: INTERNAL MEDICINE

## 2022-11-15 RX ORDER — FLUTICASONE PROPIONATE AND SALMETEROL 500; 50 UG/1; UG/1
1 POWDER RESPIRATORY (INHALATION) EVERY 12 HOURS
Qty: 60 EACH | Refills: 5 | Status: SHIPPED | OUTPATIENT
Start: 2022-11-15

## 2022-11-15 RX ORDER — FLUTICASONE PROPIONATE 50 MCG
2 SPRAY, SUSPENSION (ML) NASAL DAILY
Qty: 3 EACH | Refills: 3 | Status: SHIPPED | OUTPATIENT
Start: 2022-11-15

## 2022-11-15 NOTE — PROGRESS NOTES
HISTORY OF PRESENT ILLNESS  Allen Gill is a 50 y.o. female following up for Asthma and pneumonia. Pt with long history of Asthma, well controlled on Wixela. She was doing well until ~ 2 weeks prior to this visit when she noted sudden onset productive cough, R sided pleuritic chest pain without fever. She went to [facility] and was diagnosed with pneumonia on a CXR. She has completed a course of Doxycycline, Prednisone mg daily x 7 days, and Robaxin and is back to baseline. She has also gone back to work. Pt tests for COVID 2 x a week for work and has been negative. Records and CXR not available. Asthma triggers include pollen and other environmental factors, however pt also with NSAID allergies and NSAID induced asthma and allergic rhinitis. Pt is followed by ENT and uses Flonase and nasal saline flushes. Pt has intermittent pedal edema without orthopnea or PND. Review of Systems   Constitutional:  Negative for chills, diaphoresis, fever, malaise/fatigue and weight loss. HENT:  Positive for congestion. Negative for ear discharge, ear pain, hearing loss, nosebleeds, sinus pain, sore throat and tinnitus. Eyes:  Negative for blurred vision, double vision, photophobia, pain, discharge and redness. Respiratory:  Positive for cough (minimal). Negative for hemoptysis, sputum production, shortness of breath, wheezing and stridor. Cardiovascular:  Positive for palpitations (resolved) and leg swelling (intertmittent). Negative for chest pain, orthopnea, claudication and PND. Gastrointestinal:  Negative for abdominal pain, blood in stool, constipation, diarrhea, heartburn, melena, nausea and vomiting. Genitourinary:  Negative for dysuria, flank pain, frequency, hematuria and urgency. Musculoskeletal:  Negative for back pain, falls, joint pain, myalgias and neck pain. Heel pains   Skin:  Negative for itching and rash.    Neurological:  Negative for dizziness, tingling, tremors, sensory change, speech change, focal weakness, seizures, loss of consciousness, weakness and headaches. Endo/Heme/Allergies:  Negative for environmental allergies and polydipsia. Does not bruise/bleed easily. Psychiatric/Behavioral:  Negative for depression, hallucinations, memory loss, substance abuse and suicidal ideas. The patient is not nervous/anxious and does not have insomnia. Past Medical History:   Diagnosis Date    Allergy to nonsteroidal anti-inflammatory drug (NSAID)     Asthma     Chronic sinusitis     Nausea & vomiting      Past Surgical History:   Procedure Laterality Date    COLONOSCOPY N/A 7/22/2022    SCREENING COLONOSCOPY performed by Terell Canales MD at [address] [place] [address] HX CHOLECYSTECTOMY Bilateral 2018    HX HEENT      multiple sinus surgeries    HX HYSTERECTOMY       Current Outpatient Medications on File Prior to Visit   Medication Sig Dispense Refill    albuterol (PROVENTIL HFA, VENTOLIN HFA, PROAIR HFA) 90 mcg/actuation inhaler Take 2 Puffs by inhalation every four (4) hours as needed for Wheezing. 1 Each 3    lisinopriL (PRINIVIL, ZESTRIL) 20 mg tablet daily.  amLODIPine (NORVASC) 10 mg tablet daily. No current facility-administered medications on file prior to visit.      Allergies   Allergen Reactions    Aspirin Shortness of Breath     Other reaction(s): wheezing/sob    Nsaids (Non-Steroidal Anti-Inflammatory Drug) Shortness of Breath     Other reaction(s): anaphylaxis/angioedema     Family History   Problem Relation Age of Onset    No Known Problems Mother     No Known Problems Father      Social History     Socioeconomic History    Marital status: SINGLE     Spouse name: Not on file    Number of children: Not on file    Years of education: Not on file    Highest education level: Not on file   Occupational History    Occupation: CNA   Tobacco Use    Smoking status: Never    Smokeless tobacco: Never   Substance and Sexual Activity    Alcohol use: Not on file    Drug use: Not on file    Sexual activity: Not on file   Other Topics Concern    Not on file   Social History Narrative    Not on file     Social Determinants of Health     Financial Resource Strain: Not on file   Food Insecurity: Not on file   Transportation Needs: Not on file   Physical Activity: Not on file   Stress: Not on file   Social Connections: Not on file   Intimate Partner Violence: Not on file   Housing Stability: Not on file     Blood pressure (!) 141/85, pulse 85, temperature 97.7 °F (36.5 °C), temperature source Temporal, resp. rate 18, height 5' 7\" (1.702 m), weight 81.9 kg (180 lb 9.6 oz), SpO2 100 %. Physical Exam  Constitutional:       General: She is not in acute distress. Appearance: She is obese. She is not ill-appearing or toxic-appearing. HENT:      Head: Normocephalic and atraumatic. Right Ear: External ear normal.      Left Ear: External ear normal.      Nose:      Comments: Deferred      Mouth/Throat:      Comments: Deferred   Eyes:      General: No scleral icterus. Right eye: No discharge. Left eye: No discharge. Extraocular Movements: Extraocular movements intact. Conjunctiva/sclera: Conjunctivae normal.      Pupils: Pupils are equal, round, and reactive to light. Neck:      Vascular: No carotid bruit. Cardiovascular:      Rate and Rhythm: Normal rate and regular rhythm. Pulses: Normal pulses. Heart sounds: Normal heart sounds. No murmur heard. No gallop. Pulmonary:      Effort: Pulmonary effort is normal. No respiratory distress. Breath sounds: Normal breath sounds. No stridor. No wheezing, rhonchi or rales. Chest:      Chest wall: No tenderness. Abdominal:      General: There is no distension. Palpations: Abdomen is soft. There is no mass. Tenderness: There is no abdominal tenderness. Musculoskeletal:         General: No swelling, tenderness, deformity or signs of injury.       Cervical back: No rigidity or tenderness. No muscular tenderness. Right lower leg: No edema. Left lower leg: No edema. Lymphadenopathy:      Cervical: No cervical adenopathy. Skin:     General: Skin is warm and dry. Coloration: Skin is not jaundiced or pale. Findings: No bruising, erythema, lesion or rash. Neurological:      General: No focal deficit present. Mental Status: She is alert and oriented to person, place, and time. Coordination: Coordination normal.   Psychiatric:         Mood and Affect: Mood normal.         Behavior: Behavior normal.         Thought Content: Thought content normal.         Judgment: Judgment normal.     Spirometry 9/16/2022: normal flows and flow volume loop    ASSESSMENT and PLAN  Encounter Diagnoses   Name Primary?  Mild persistent asthma without complication Yes    Environmental allergies     Community acquired pneumonia, unspecified laterality        Pt with well asthma well controlled on LABA/ICS. Will continue Advair and prn Albuterol. Refill sent for Advair  Continue Flonase  Trigger avoidance discussed. Follow up chest PA L in 6 weeks, will call pt with results. RTC 6 months   Renny Roland results reviewed with pt.

## 2022-11-15 NOTE — PROGRESS NOTES
Mary David presents today for   Chief Complaint   Patient presents with    Follow-up       Is someone accompanying this pt? no    Is the patient using any DME equipment during OV? no    -DME Company n/a    Depression Screening:  3 most recent PHQ Screens 11/15/2022   Little interest or pleasure in doing things Not at all   Feeling down, depressed, irritable, or hopeless Not at all   Total Score PHQ 2 0       Learning Assessment:  Learning Assessment 9/16/2021   PRIMARY LEARNER Patient   PRIMARY LANGUAGE ENGLISH   LEARNER PREFERENCE PRIMARY DEMONSTRATION   RELATIONSHIP SELF       Abuse Screening:  No flowsheet data found. Fall Risk  No flowsheet data found. Coordination of Care:  1. Have you been to the ER, urgent care clinic since your last visit? Hospitalized since your last visit? Yes; Name: [facility]    2. Have you seen or consulted any other health care providers outside of the [address] [place] [address] since your last visit? Include any pap smears or colon screening.  no

## 2022-11-16 ENCOUNTER — HOSPITAL ENCOUNTER (OUTPATIENT)
Dept: GENERAL RADIOLOGY | Age: 52
Discharge: HOME | End: 2022-11-16
Payer: COMMERCIAL

## 2022-11-16 ENCOUNTER — TRANSCRIBE ORDER (OUTPATIENT)
Dept: REGISTRATION | Age: 52
End: 2022-11-16

## 2022-11-16 DIAGNOSIS — J18.9 PNEUMONIA: ICD-10-CM

## 2022-11-16 DIAGNOSIS — J18.9 PNEUMONIA: Primary | ICD-10-CM

## 2022-11-16 PROCEDURE — 71046 X-RAY EXAM CHEST 2 VIEWS: CPT

## 2022-11-17 ENCOUNTER — TELEPHONE (OUTPATIENT)
Dept: PULMONOLOGY | Age: 52
End: 2022-11-17

## 2023-05-16 ENCOUNTER — HOSPITAL ENCOUNTER (OUTPATIENT)
Facility: HOSPITAL | Age: 53
Discharge: HOME | End: 2023-05-19
Payer: COMMERCIAL

## 2023-05-16 DIAGNOSIS — Z12.31 BREAST CANCER SCREENING BY MAMMOGRAM: ICD-10-CM

## 2023-05-16 PROCEDURE — 77063 BREAST TOMOSYNTHESIS BI: CPT

## 2023-05-25 ENCOUNTER — OFFICE VISIT (OUTPATIENT)
Age: 53
End: 2023-05-25
Payer: COMMERCIAL

## 2023-05-25 VITALS
WEIGHT: 184 LBS | DIASTOLIC BLOOD PRESSURE: 86 MMHG | BODY MASS INDEX: 28.88 KG/M2 | TEMPERATURE: 98 F | HEIGHT: 67 IN | SYSTOLIC BLOOD PRESSURE: 124 MMHG | RESPIRATION RATE: 16 BRPM | OXYGEN SATURATION: 97 % | HEART RATE: 75 BPM

## 2023-05-25 DIAGNOSIS — J45.30 MILD PERSISTENT ASTHMA, UNCOMPLICATED: Primary | ICD-10-CM

## 2023-05-25 DIAGNOSIS — J30.1 ALLERGIC RHINITIS DUE TO POLLEN, UNSPECIFIED SEASONALITY: ICD-10-CM

## 2023-05-25 DIAGNOSIS — J33.9 NASAL POLYPS: ICD-10-CM

## 2023-05-25 PROCEDURE — 94010 BREATHING CAPACITY TEST: CPT | Performed by: INTERNAL MEDICINE

## 2023-05-25 PROCEDURE — 3074F SYST BP LT 130 MM HG: CPT | Performed by: INTERNAL MEDICINE

## 2023-05-25 PROCEDURE — 3079F DIAST BP 80-89 MM HG: CPT | Performed by: INTERNAL MEDICINE

## 2023-05-25 PROCEDURE — 99214 OFFICE O/P EST MOD 30 MIN: CPT | Performed by: INTERNAL MEDICINE

## 2023-05-25 RX ORDER — IPRATROPIUM BROMIDE 21 UG/1
2 SPRAY, METERED NASAL EVERY 12 HOURS
Qty: 30 ML | Refills: 3 | Status: SHIPPED | OUTPATIENT
Start: 2023-05-25

## 2023-05-25 RX ORDER — AZELASTINE 1 MG/ML
1 SPRAY, METERED NASAL 2 TIMES DAILY
COMMUNITY
Start: 2019-05-08 | End: 2023-05-25

## 2023-05-25 RX ORDER — TRAMADOL HYDROCHLORIDE 50 MG/1
50 TABLET ORAL 2 TIMES DAILY PRN
COMMUNITY
Start: 2019-07-17 | End: 2023-05-25

## 2023-05-25 RX ORDER — HYDROCHLOROTHIAZIDE 12.5 MG/1
CAPSULE, GELATIN COATED ORAL
COMMUNITY
Start: 2023-05-16

## 2023-05-25 RX ORDER — FLUTICASONE PROPIONATE AND SALMETEROL 500; 50 UG/1; UG/1
POWDER RESPIRATORY (INHALATION)
COMMUNITY
Start: 2018-08-02

## 2023-05-25 ASSESSMENT — ENCOUNTER SYMPTOMS
VOMITING: 0
STRIDOR: 0
FACIAL SWELLING: 0
ABDOMINAL PAIN: 0
TROUBLE SWALLOWING: 0
EYE ITCHING: 0
BACK PAIN: 0
CONSTIPATION: 0
APNEA: 0
PHOTOPHOBIA: 0
EYE DISCHARGE: 0
RHINORRHEA: 1
EYE REDNESS: 0
CHOKING: 0
EYE PAIN: 0
NAUSEA: 0
WHEEZING: 1
DIARRHEA: 0
BLOOD IN STOOL: 0
COLOR CHANGE: 0
COUGH: 1
CHEST TIGHTNESS: 0
SHORTNESS OF BREATH: 1
VOICE CHANGE: 0

## 2023-05-25 NOTE — PROGRESS NOTES
Ana Hastings presents today for   Chief Complaint   Patient presents with    Asthma    Follow-up    Results     Dre Ambrose done in office today       Is someone accompanying this pt? No    Is the patient using any DME equipment during OV? No    -DME Company NA    Depression Screening:    PHQ-9 Questionaire 11/15/2022   Little interest or pleasure in doing things 0   Feeling down, depressed, or hopeless 0   PHQ-9 Total Score 0       Learning Needs Questionnaire:     Who is the primary learner? Patient    What is the preferred language for health care of the primary learner? ENGLISH    How does the primary learner prefer to learn new concepts? DEMONSTRATION    Answered By Patient    Relationship to Learner SELF          Fall Risk:     No flowsheet data found. Abuse Screening:     No flowsheet data found. Coordination of Care:    1. Have you been to the ER, urgent care clinic since your last visit? Hospitalized since your last visit? No    2. Have you seen or consulted any other health care providers outside of the [address] [place] [address] since your last visit? Include any pap smears or colon screening. PCP    Medication list has been update per patient.
disturbance. Respiratory:  Positive for cough, shortness of breath and wheezing. Negative for apnea, choking, chest tightness and stridor. Cardiovascular:  Negative for chest pain, palpitations and leg swelling. Gastrointestinal:  Negative for abdominal pain, blood in stool, constipation, diarrhea, nausea and vomiting. Endocrine: Negative for cold intolerance, heat intolerance, polydipsia, polyphagia and polyuria. Genitourinary:  Negative for difficulty urinating, dysuria, flank pain, hematuria, urgency and vaginal pain. Musculoskeletal:  Negative for arthralgias, back pain, gait problem, joint swelling, myalgias, neck pain and neck stiffness. Skin:  Negative for color change, pallor, rash and wound. Allergic/Immunologic: Positive for environmental allergies. Negative for food allergies and immunocompromised state. Neurological:  Negative for dizziness, tremors, syncope, speech difficulty, weakness, light-headedness and numbness. Hematological:  Does not bruise/bleed easily. Psychiatric/Behavioral:  Negative for agitation, dysphoric mood, hallucinations, self-injury and suicidal ideas. The patient is not nervous/anxious. Past Medical History:   Diagnosis Date    Allergy to nonsteroidal anti-inflammatory drug (NSAID)     Asthma     Chronic sinusitis     Nausea & vomiting      Past Surgical History:   Procedure Laterality Date    CHOLECYSTECTOMY Bilateral 2018    COLONOSCOPY N/A 7/22/2022    SCREENING COLONOSCOPY performed by Anne Marie Bruno MD at [address] [place] [address]      multiple sinus surgeries    HYSTERECTOMY (CERVIX STATUS UNKNOWN)       Current Outpatient Medications on File Prior to Visit   Medication Sig Dispense Refill    hydroCHLOROthiazide (MICROZIDE) 12.5 MG capsule       fluticasone-salmeterol (ADVAIR) 500-50 MCG/ACT AEPB diskus inhaler INHALE 1 PUFF BY MOUTH TWICE DAILY.       albuterol sulfate HFA (PROVENTIL;VENTOLIN;PROAIR) 108 (90 Base) MCG/ACT inhaler Inhale 2 puffs

## 2023-06-01 ENCOUNTER — TELEPHONE (OUTPATIENT)
Age: 53
End: 2023-06-01

## 2023-06-01 RX ORDER — BENZONATATE 200 MG/1
200 CAPSULE ORAL 3 TIMES DAILY PRN
Qty: 30 CAPSULE | Refills: 0 | Status: SHIPPED | OUTPATIENT
Start: 2023-06-01

## 2023-06-01 NOTE — TELEPHONE ENCOUNTER
Pt stated that the medication is working however still have this cough. Pt wants to know if [facility] can place an order for tessalon to help with the cough. PT does have an ENT appt on 7/10 @ 11a. Please call [phone] for further information.

## 2023-06-02 ENCOUNTER — TELEPHONE (OUTPATIENT)
Age: 53
End: 2023-06-02

## 2023-07-21 LAB
CHOLEST SERPL-MCNC: 183 MG/DL
CHOLEST SERPL-MCNC: 183 MG/DL
GLUCOSE SERPL-MCNC: 102 MG/DL (ref 74–99)
GLUCOSE SERPL-MCNC: 102 MG/DL (ref 74–99)
HDLC SERPL-MCNC: 81 MG/DL (ref 40–60)
HDLC SERPL-MCNC: 81 MG/DL (ref 40–60)
LDLC SERPL CALC-MCNC: 89.2 MG/DL (ref 0–100)
LDLC SERPL CALC-MCNC: 89.2 MG/DL (ref 0–100)
TRIGL SERPL-MCNC: 64 MG/DL
TRIGL SERPL-MCNC: 64 MG/DL (ref ?–150)

## 2023-09-14 RX ORDER — FLUTICASONE PROPIONATE AND SALMETEROL 500; 50 UG/1; UG/1
POWDER RESPIRATORY (INHALATION)
Qty: 3 EACH | Refills: 1 | Status: SHIPPED | OUTPATIENT
Start: 2023-09-14

## 2023-12-27 ENCOUNTER — OFFICE VISIT (OUTPATIENT)
Age: 53
End: 2023-12-27
Payer: COMMERCIAL

## 2023-12-27 VITALS
HEIGHT: 67 IN | TEMPERATURE: 97.4 F | DIASTOLIC BLOOD PRESSURE: 70 MMHG | RESPIRATION RATE: 18 BRPM | OXYGEN SATURATION: 97 % | SYSTOLIC BLOOD PRESSURE: 124 MMHG | WEIGHT: 195 LBS | HEART RATE: 87 BPM | BODY MASS INDEX: 30.61 KG/M2

## 2023-12-27 DIAGNOSIS — J30.9 ALLERGIC RHINITIS, UNSPECIFIED SEASONALITY, UNSPECIFIED TRIGGER: ICD-10-CM

## 2023-12-27 DIAGNOSIS — J33.9 NASAL POLYPS: ICD-10-CM

## 2023-12-27 DIAGNOSIS — J45.30 MILD PERSISTENT ASTHMA WITHOUT COMPLICATION: Primary | ICD-10-CM

## 2023-12-27 PROCEDURE — 3078F DIAST BP <80 MM HG: CPT | Performed by: INTERNAL MEDICINE

## 2023-12-27 PROCEDURE — 99214 OFFICE O/P EST MOD 30 MIN: CPT | Performed by: INTERNAL MEDICINE

## 2023-12-27 PROCEDURE — 3074F SYST BP LT 130 MM HG: CPT | Performed by: INTERNAL MEDICINE

## 2023-12-27 NOTE — PROGRESS NOTES
Masha Koch presents today for   Chief Complaint   Patient presents with    Asthma    Follow-up       Is someone accompanying this pt? No    Is the patient using any DME equipment during OV? No    -DME Company NA    Depression Screenin/15/2022     3:33 PM   PHQ-9 Questionaire   Little interest or pleasure in doing things 0   Feeling down, depressed, or hopeless 0   PHQ-9 Total Score 0       Learning Needs Questionnaire:     No question data found. Fall Risk:          No data to display                 Abuse Screening:          No data to display                  Coordination of Care:    1. Have you been to the ER, urgent care clinic since your last visit? Hospitalized since your last visit? No    2. Have you seen or consulted any other health care providers outside of the [address] [place] [address] since your last visit? Include any pap smears or colon screening. PCP    Medication list has been update per patient.
Mood normal.         Behavior: Behavior normal.       Spirometry 5/25/2023: normal flows and flow volume loop           An electronic signature was used to authenticate this note.     --Neeta Sahu MD

## 2024-01-15 ENCOUNTER — TELEPHONE (OUTPATIENT)
Age: 54
End: 2024-01-15

## 2024-01-15 NOTE — TELEPHONE ENCOUNTER
Pt stated that she has not heard from ENT in two weeks. Pt believe that they may not be in her network and would like to be referred somewhere else. Please call [phone]

## 2024-01-15 NOTE — TELEPHONE ENCOUNTER
New ENT referral from 12/27/23 was sent to Dr. Zacarias's group. Number given [phone].  She will call

## 2024-03-15 ENCOUNTER — TRANSCRIBE ORDERS (OUTPATIENT)
Facility: HOSPITAL | Age: 54
End: 2024-03-15

## 2024-03-15 ENCOUNTER — HOSPITAL ENCOUNTER (OUTPATIENT)
Facility: HOSPITAL | Age: 54
Discharge: HOME | End: 2024-03-15
Payer: COMMERCIAL

## 2024-03-15 ENCOUNTER — HOSPITAL ENCOUNTER (OUTPATIENT)
Facility: HOSPITAL | Age: 54
End: 2024-03-15
Payer: COMMERCIAL

## 2024-03-15 DIAGNOSIS — Z01.818 PREOP EXAMINATION: ICD-10-CM

## 2024-03-15 DIAGNOSIS — Z01.818 PREOP EXAMINATION: Primary | ICD-10-CM

## 2024-03-15 LAB
ANION GAP SERPL CALC-SCNC: 5 MMOL/L (ref 3–18)
BASOPHILS # BLD: 0.1 K/UL (ref 0–0.1)
BASOPHILS NFR BLD: 1 % (ref 0–2)
BUN SERPL-MCNC: 10 MG/DL (ref 7–18)
BUN/CREAT SERPL: 15 (ref 12–20)
CALCIUM SERPL-MCNC: 9.4 MG/DL (ref 8.5–10.1)
CHLORIDE SERPL-SCNC: 103 MMOL/L (ref 100–111)
CO2 SERPL-SCNC: 32 MMOL/L (ref 21–32)
CREAT SERPL-MCNC: 0.68 MG/DL (ref 0.6–1.3)
DIFFERENTIAL METHOD BLD: ABNORMAL
EKG ATRIAL RATE: 78 BPM
EKG DIAGNOSIS: NORMAL
EKG P AXIS: 108 DEGREES
EKG P-R INTERVAL: 174 MS
EKG Q-T INTERVAL: 376 MS
EKG QRS DURATION: 86 MS
EKG QTC CALCULATION (BAZETT): 428 MS
EKG R AXIS: 19 DEGREES
EKG T AXIS: 105 DEGREES
EKG VENTRICULAR RATE: 78 BPM
EOSINOPHIL # BLD: 0.4 K/UL (ref 0–0.4)
EOSINOPHIL NFR BLD: 7 % (ref 0–5)
ERYTHROCYTE [DISTWIDTH] IN BLOOD BY AUTOMATED COUNT: 12.3 % (ref 11.6–14.5)
GLUCOSE SERPL-MCNC: 80 MG/DL (ref 74–99)
HCT VFR BLD AUTO: 40.1 % (ref 35–45)
HGB BLD-MCNC: 13.3 G/DL (ref 12–16)
IMM GRANULOCYTES # BLD AUTO: 0 K/UL (ref 0–0.04)
IMM GRANULOCYTES NFR BLD AUTO: 0 % (ref 0–0.5)
INR PPP: 0.9 (ref 0.9–1.1)
LYMPHOCYTES # BLD: 1.8 K/UL (ref 0.9–3.6)
LYMPHOCYTES NFR BLD: 33 % (ref 21–52)
MCH RBC QN AUTO: 32.4 PG (ref 24–34)
MCHC RBC AUTO-ENTMCNC: 33.2 G/DL (ref 31–37)
MCV RBC AUTO: 97.6 FL (ref 78–100)
MONOCYTES # BLD: 0.8 K/UL (ref 0.05–1.2)
MONOCYTES NFR BLD: 15 % (ref 3–10)
NEUTS SEG # BLD: 2.4 K/UL (ref 1.8–8)
NEUTS SEG NFR BLD: 44 % (ref 40–73)
NRBC # BLD: 0 K/UL (ref 0–0.01)
NRBC BLD-RTO: 0 PER 100 WBC
PLATELET # BLD AUTO: 325 K/UL (ref 135–420)
PMV BLD AUTO: 8.9 FL (ref 9.2–11.8)
POTASSIUM SERPL-SCNC: 4 MMOL/L (ref 3.5–5.5)
PROTHROMBIN TIME: 12.6 SEC (ref 11.9–14.7)
RBC # BLD AUTO: 4.11 M/UL (ref 4.2–5.3)
SODIUM SERPL-SCNC: 140 MMOL/L (ref 136–145)
WBC # BLD AUTO: 5.4 K/UL (ref 4.6–13.2)

## 2024-03-15 PROCEDURE — 36415 COLL VENOUS BLD VENIPUNCTURE: CPT

## 2024-03-15 PROCEDURE — 93010 ELECTROCARDIOGRAM REPORT: CPT | Performed by: INTERNAL MEDICINE

## 2024-03-15 PROCEDURE — 85610 PROTHROMBIN TIME: CPT

## 2024-03-15 PROCEDURE — 85025 COMPLETE CBC W/AUTO DIFF WBC: CPT

## 2024-03-15 PROCEDURE — 93005 ELECTROCARDIOGRAM TRACING: CPT

## 2024-03-15 PROCEDURE — 80048 BASIC METABOLIC PNL TOTAL CA: CPT

## 2024-03-15 PROCEDURE — 71046 X-RAY EXAM CHEST 2 VIEWS: CPT

## 2024-07-23 NOTE — TELEPHONE ENCOUNTER
Pt called in requesting refills for advair inhaler and sent to [facility] mail order. Pt was seen last on 12/27/23, unable to alan F/U due to [facility] booked out alan. Pls advise

## 2024-07-24 RX ORDER — FLUTICASONE PROPIONATE AND SALMETEROL 500; 50 UG/1; UG/1
1 POWDER RESPIRATORY (INHALATION) 2 TIMES DAILY
Qty: 3 EACH | Refills: 5 | Status: SHIPPED | OUTPATIENT
Start: 2024-07-24

## 2025-01-09 ENCOUNTER — HOSPITAL ENCOUNTER (EMERGENCY)
Facility: HOSPITAL | Age: 55
Discharge: HOME | End: 2025-01-09
Payer: COMMERCIAL

## 2025-01-09 VITALS
OXYGEN SATURATION: 100 % | TEMPERATURE: 98.4 F | SYSTOLIC BLOOD PRESSURE: 156 MMHG | RESPIRATION RATE: 18 BRPM | DIASTOLIC BLOOD PRESSURE: 87 MMHG | HEART RATE: 93 BPM

## 2025-01-09 DIAGNOSIS — I10 HYPERTENSION, UNSPECIFIED TYPE: Primary | ICD-10-CM

## 2025-01-09 DIAGNOSIS — R51.9 NONINTRACTABLE HEADACHE, UNSPECIFIED CHRONICITY PATTERN, UNSPECIFIED HEADACHE TYPE: ICD-10-CM

## 2025-01-09 LAB
APPEARANCE UR: CLEAR
BILIRUB UR QL: NEGATIVE
COLOR UR: YELLOW
GLUCOSE UR STRIP.AUTO-MCNC: NEGATIVE MG/DL
HGB UR QL STRIP: NEGATIVE
KETONES UR QL STRIP.AUTO: 40 MG/DL
LEUKOCYTE ESTERASE UR QL STRIP.AUTO: NEGATIVE
NITRITE UR QL STRIP.AUTO: NEGATIVE
PH UR STRIP: 5 (ref 5–8)
PROT UR STRIP-MCNC: NEGATIVE MG/DL
SP GR UR REFRACTOMETRY: 1.03 (ref 1–1.03)
UROBILINOGEN UR QL STRIP.AUTO: 0.2 EU/DL (ref 0.2–1)

## 2025-01-09 PROCEDURE — 99284 EMERGENCY DEPT VISIT MOD MDM: CPT

## 2025-01-09 PROCEDURE — 81003 URINALYSIS AUTO W/O SCOPE: CPT

## 2025-01-09 PROCEDURE — 93005 ELECTROCARDIOGRAM TRACING: CPT | Performed by: PHYSICIAN ASSISTANT

## 2025-01-09 RX ORDER — BUTALBITAL, ACETAMINOPHEN AND CAFFEINE 300; 40; 50 MG/1; MG/1; MG/1
1 CAPSULE ORAL EVERY 6 HOURS PRN
Qty: 12 CAPSULE | Refills: 0 | Status: SHIPPED | OUTPATIENT
Start: 2025-01-09 | End: 2025-01-12

## 2025-01-09 NOTE — ED TRIAGE NOTES
States that she was seen at her eye doctor yesterday and told that saw hemorrhages and was concerned for pre diabetes. States that has been high bp reading all day. 185/117. Headache since yesterday.states eyes were dilated yesterday at doctor.

## 2025-01-09 NOTE — ED PROVIDER NOTES
EMERGENCY DEPARTMENT HISTORY AND PHYSICAL EXAM    Date: 1/9/2025  Patient Name: Zack Gray    History of Presenting Illness     Chief Complaint:   Chief Complaint   Patient presents with    Hypertension    Headache     History Provided By: Patient    Additional History (Context): Zack Gray is a 54 y.o. female with hx of HTN, chronic sinusitis, asthma, ambulatory to ED with concerns about elevated BP.  States, she was seen at her routine eye doctor yesterday and told that saw hemorrhages and was concerned for pre diabetes.  She has no history of diabetes and denies polyuria or polydipsia. States, she has been having intermittent headaches of gradual onset and BP running high, highest earlier today was 185/117.  Notes that BP improved spontaneously. She is compliant with her BP meds, amlodipine and lisinopril.   Patient takes Tylenol for headaches but it does not help every time.  She cannot take NSAIDs due to allergy to NSAID meds. denies dizziness, vision changes, nausea, vomiting, neck pain/stiffness, sinus pain/pressure, cold-like symptoms, CP, palp's, SOB, weakness, numbness.      PCP: Tyson Parsons PA    No current facility-administered medications for this encounter.     Current Outpatient Medications   Medication Sig Dispense Refill    butalbital-APAP-caffeine -40 MG CAPS per capsule Take 1 capsule by mouth every 6 hours as needed for Headaches 12 capsule 0    fluticasone-salmeterol (ADVAIR) 500-50 MCG/ACT AEPB diskus inhaler Inhale 1 puff into the lungs in the morning and at bedtime 3 each 5    benzonatate (TESSALON) 200 MG capsule Take 1 capsule by mouth 3 times daily as needed for Cough (Patient not taking: Reported on 12/27/2023) 30 capsule 0    hydroCHLOROthiazide (MICROZIDE) 12.5 MG capsule       azelastine (ASTELIN) 0.1 % nasal spray 1 spray by Nasal route 2 times daily (Patient not taking: Reported on 5/25/2023)      ipratropium (ATROVENT) 0.03 % nasal spray 2 sprays by Each

## 2025-01-10 ENCOUNTER — CARE COORDINATION (OUTPATIENT)
Dept: OTHER | Facility: CLINIC | Age: 55
End: 2025-01-10

## 2025-01-10 LAB
EKG ATRIAL RATE: 82 BPM
EKG DIAGNOSIS: NORMAL
EKG P AXIS: 66 DEGREES
EKG P-R INTERVAL: 172 MS
EKG Q-T INTERVAL: 362 MS
EKG QRS DURATION: 76 MS
EKG QTC CALCULATION (BAZETT): 422 MS
EKG R AXIS: 6 DEGREES
EKG T AXIS: 68 DEGREES
EKG VENTRICULAR RATE: 82 BPM

## 2025-01-10 PROCEDURE — 93010 ELECTROCARDIOGRAM REPORT: CPT | Performed by: INTERNAL MEDICINE

## 2025-01-10 NOTE — CARE COORDINATION
Ambulatory Care Coordination Note     1/10/2025 5:43 PM     Patient outreach attempt by this ACM today to offer care management services. ACM was unable to reach the patient by telephone today;   left voice message requesting a return phone call to this ACM.     ACM: Desirae Graham RN     Care Summary Note: Pt was see at [facility] ER 1/9/25  Diagnosis:  HTN    PCP/Specialist follow up:   Future Appointments         Provider Specialty Dept Phone    1/16/2025 12:40 PM (Arrive by 12:25 PM) [facility] 3D Radiology [phone]    2/4/2025 11:00 AM [facility] Pulmonology [phone]    2/4/2025 12:30 PM Laila Garcia MD Pulmonology [phone]            Follow Up:   Plan for next ACM outreach in approximately 1-2 days  to complete:  - outreach attempt to offer care management services.      Desirae Graham RN, AAS Associate Care Manager  [place] [address]  Cell [phone] Fax [phone]

## 2025-01-13 ENCOUNTER — CARE COORDINATION (OUTPATIENT)
Dept: OTHER | Facility: CLINIC | Age: 55
End: 2025-01-13

## 2025-01-13 RX ORDER — BUTALBITAL, ACETAMINOPHEN AND CAFFEINE 50; 325; 40 MG/1; MG/1; MG/1
1 TABLET ORAL EVERY 4 HOURS PRN
COMMUNITY
End: 2025-01-13 | Stop reason: CLARIF

## 2025-01-13 NOTE — CARE COORDINATION
Ambulatory Care Coordination Note     2025 4:57 PM     Patient Current Location:  [place]     This patient was received as a referral from Ambulatory Care Manager .    ACM contacted the patient by telephone. Verified name and  with patient as identifiers. Provided introduction to self, and explanation of the ACM role.   Patient accepted care management services at this time.          ACM: Desirae Graham RN     Challenges to be reviewed by the provider   Additional needs identified to be addressed with provider Yes  Seen in the ER 25. B/P remains very high 180/109. Pt reported she was prescribed Fioricet in ER for headache. Advised pt to hold until she speaks with PCP due to concern med maybe elevating B/P even more.     Pt reported edema with feet           Method of communication with provider:  pt has an appt scheduled .    Utilization: N/A - Initial Call     Care Summary Note:   Pt was seen at [facility] Emergency Department 25  Diagnosis:  1. Hypertension, unspecified type    2. Nonintractable headache, unspecified chronicity pattern, unspecified headache type      Pt reported she discovered her B/P was high during routine eye exam last week and noted having, \"black floaters.\" Pt reported she has been taking prescribed meds, but her B/P remains high 180/109. Pt also reports having a headache even after taking prescribed Fioricet daily since ER visit. This ACM advised pt to hold med until she attends appt with PCP tomorrow to see if med is elevating B/P even more due to caffeine being an active ingredient. Advised pt to take OTC Tylenol only. Pt denies excessive caffeine intake noting she only drinks coffee 3 x weeks. Denies taking decongestants, herbal supplements or any other supplements. Pt reports having edema with her feet/toes, which is new. Patient denies C/P, SOB, cough, wheezing, fever, pain legs or feet, N/V, diarrhea, difficulty urinating or constipation.

## 2025-01-16 ENCOUNTER — HOSPITAL ENCOUNTER (OUTPATIENT)
Facility: HOSPITAL | Age: 55
Discharge: HOME | End: 2025-01-19
Payer: COMMERCIAL

## 2025-01-16 VITALS — BODY MASS INDEX: 30.45 KG/M2 | WEIGHT: 194 LBS | HEIGHT: 67 IN

## 2025-01-16 DIAGNOSIS — Z12.31 OTHER SCREENING MAMMOGRAM: ICD-10-CM

## 2025-01-16 PROCEDURE — 77063 BREAST TOMOSYNTHESIS BI: CPT

## 2025-01-20 ENCOUNTER — CARE COORDINATION (OUTPATIENT)
Dept: OTHER | Facility: CLINIC | Age: 55
End: 2025-01-20

## 2025-01-20 NOTE — CARE COORDINATION
Ambulatory Care Coordination Note     2025 6:33 PM     Patient Current Location:  [facility] contacted the patient by telephone. Verified name and  with patient as identifiers.         ACM: Desirae Graham RN     Challenges to be reviewed by the provider   Additional needs identified to be addressed with provider No  none               Method of communication with provider: none.    Utilization: Patient has not had any utilization since our last call.     Care Summary Note:   Pt was seen at [facility] Emergency Department 25  Diagnosis:  1. Hypertension, unspecified type    2. Nonintractable headache, unspecified chronicity pattern, unspecified headache type     Pt reported improvement with B/P 138/84. Pt reported she had been using a wrist cuff and it was determined it was not accurate; Pt purchased arm cuff. Pt attended f/u appt with PCP and it was discovered according to her labs that she has, \"hyperthyroidism and was referred to endo.\" Pt was not started on meds, deferred to endo for management. Pt reported h/a's are better. No changes to med.     Offered patient enrollment in the Remote Patient Monitoring (RPM) program for in-home monitoring: Patient is not eligible for RPM program because: insurance coverage.     Assessments Completed:   Ambulatory Care Coordination Assessment    Care Coordination Protocol  Referral from Primary Care Provider: No  Week 1 - Initial Assessment     Do you have all of your prescriptions and are they filled?: Yes  Barriers to medication adherence: None  Are you able to afford your medications?: Yes  How often do you have trouble taking your medications the way you have been told to take them?: I always take them as prescribed.        Ability to seek help/take action for Emergent Urgent situations i.e. fire, crime, inclement weather or health crisis.: Independent  Ability to ambulate to restroom: Independent  Ability handle personal hygeine needs

## 2025-02-04 ENCOUNTER — OFFICE VISIT (OUTPATIENT)
Age: 55
End: 2025-02-04
Payer: COMMERCIAL

## 2025-02-04 VITALS
RESPIRATION RATE: 16 BRPM | TEMPERATURE: 97.2 F | HEIGHT: 67 IN | SYSTOLIC BLOOD PRESSURE: 153 MMHG | HEART RATE: 83 BPM | OXYGEN SATURATION: 100 % | WEIGHT: 193 LBS | BODY MASS INDEX: 30.29 KG/M2 | DIASTOLIC BLOOD PRESSURE: 90 MMHG

## 2025-02-04 DIAGNOSIS — J30.9 ALLERGIC RHINITIS, UNSPECIFIED SEASONALITY, UNSPECIFIED TRIGGER: ICD-10-CM

## 2025-02-04 DIAGNOSIS — J45.30 MILD PERSISTENT ASTHMA WITHOUT COMPLICATION: Primary | ICD-10-CM

## 2025-02-04 PROCEDURE — 94010 BREATHING CAPACITY TEST: CPT | Performed by: INTERNAL MEDICINE

## 2025-02-04 PROCEDURE — 3077F SYST BP >= 140 MM HG: CPT | Performed by: INTERNAL MEDICINE

## 2025-02-04 PROCEDURE — 3080F DIAST BP >= 90 MM HG: CPT | Performed by: INTERNAL MEDICINE

## 2025-02-04 PROCEDURE — 99214 OFFICE O/P EST MOD 30 MIN: CPT | Performed by: INTERNAL MEDICINE

## 2025-02-04 ASSESSMENT — ENCOUNTER SYMPTOMS
NAUSEA: 0
PHOTOPHOBIA: 0
BLOOD IN STOOL: 0
TROUBLE SWALLOWING: 0
APNEA: 0
DIARRHEA: 0
EYE PAIN: 0
WHEEZING: 1
VOICE CHANGE: 0
EYE REDNESS: 0
CONSTIPATION: 0
VOMITING: 0
BACK PAIN: 0
CHOKING: 0
FACIAL SWELLING: 0
EYE DISCHARGE: 0
COUGH: 1
RHINORRHEA: 0
SHORTNESS OF BREATH: 1
STRIDOR: 0
COLOR CHANGE: 0
ABDOMINAL PAIN: 0
CHEST TIGHTNESS: 0
EYE ITCHING: 0

## 2025-02-04 NOTE — PROGRESS NOTES
Zack Gray presents today for   Chief Complaint   Patient presents with    Asthma     Mild, persistent, uncomplicated    Results     Spirometry done in office today       Is someone accompanying this pt? No    Is the patient using any DME equipment during OV? NO    -DME Company NA    Depression Screenin/15/2022     3:33 PM   PHQ-9 Questionaire   Little interest or pleasure in doing things 0   Feeling down, depressed, or hopeless 0   PHQ-9 Total Score 0       Learning Needs Questionnaire:     No question data found.      Fall Risk:          No data to display                 Abuse Screening:          No data to display                  Coordination of Care:    1. Have you been to the ER, urgent care clinic since your last visit? Hospitalized since your last visit? NO    2. Have you seen or consulted any other health care providers outside of the [facility] since your last visit? Include any pap smears or colon screening. PCP    Medication list has been update per patient.        
rhinorrhea, sneezing, tinnitus, trouble swallowing and voice change.    Eyes:  Positive for visual disturbance. Negative for photophobia, pain, discharge, redness and itching.   Respiratory:  Positive for cough, shortness of breath (resolved) and wheezing (rare). Negative for apnea, choking, chest tightness and stridor.    Cardiovascular:  Negative for chest pain, palpitations and leg swelling.   Gastrointestinal:  Negative for abdominal pain, blood in stool, constipation, diarrhea, nausea and vomiting.   Endocrine: Negative for cold intolerance, heat intolerance, polydipsia, polyphagia and polyuria.   Genitourinary:  Negative for difficulty urinating, dysuria, flank pain, hematuria, urgency and vaginal pain.   Musculoskeletal:  Negative for arthralgias, back pain, gait problem, joint swelling, myalgias, neck pain and neck stiffness.   Skin:  Negative for color change, pallor, rash and wound.   Allergic/Immunologic: Positive for environmental allergies. Negative for food allergies and immunocompromised state.   Neurological:  Positive for headaches. Negative for dizziness, tremors, syncope, speech difficulty, weakness, light-headedness and numbness.   Hematological:  Does not bruise/bleed easily.   Psychiatric/Behavioral:  Negative for agitation, dysphoric mood, hallucinations, self-injury and suicidal ideas. The patient is not nervous/anxious.         Past Medical History:   Diagnosis Date    Allergy to nonsteroidal anti-inflammatory drug (NSAID)     Asthma     Chronic sinusitis     Nausea & vomiting      Past Surgical History:   Procedure Laterality Date    CHOLECYSTECTOMY Bilateral 2018    COLONOSCOPY N/A 7/22/2022    SCREENING COLONOSCOPY performed by Micky Narayan MD at [facility] ENDOSCOPY    HEENT      multiple sinus surgeries    HYSTERECTOMY (CERVIX STATUS UNKNOWN)       Current Outpatient Medications on File Prior to Visit   Medication Sig Dispense Refill    butalbital-APAP-caffeine -40 MG CAPS per

## 2025-02-07 ENCOUNTER — CARE COORDINATION (OUTPATIENT)
Dept: OTHER | Facility: CLINIC | Age: 55
End: 2025-02-07

## 2025-02-07 NOTE — CARE COORDINATION
Ambulatory Care Coordination Note     2025 5:54 PM     Patient Current Location:  [place]     MARIANNA contacted the patient by telephone. Verified name and  with patient as identifiers.         ACM: Desirae Graham RN     Challenges to be reviewed by the provider   Additional needs identified to be addressed with provider No  none               Method of communication with provider: none.    Utilization: Patient has not had any utilization since our last call.     Care Summary Note:   Pt was seen at [facility] Emergency Department 25  Diagnosis:  1. Hypertension, unspecified type    2. Nonintractable headache, unspecified chronicity pattern, unspecified headache type     Pt reported she is doing well, but noted B/P remains slightly elevated. B/P - 132-148/82-89 on average. Pt reported she attended appt with endo for eval of abnormal thyroid labs in addition to having an U/S of her neck. Pt is scheduled to have labs rechecked 2/10/25 after holding biotin supplement for several days. Pt reported headaches has decreased in frequency.     Offered patient enrollment in the Remote Patient Monitoring (RPM) program for in-home monitoring: Patient is not eligible for RPM program because: insurance coverage.     Assessments Completed:   Ambulatory Care Coordination Assessment    Care Coordination Protocol  Referral from Primary Care Provider: No  Week 1 - Initial Assessment     Do you have all of your prescriptions and are they filled?: Yes  Barriers to medication adherence: None  Are you able to afford your medications?: Yes  How often do you have trouble taking your medications the way you have been told to take them?: I always take them as prescribed.        Ability to seek help/take action for Emergent Urgent situations i.e. fire, crime, inclement weather or health crisis.: Independent  Ability to ambulate to restroom: Independent  Ability handle personal hygeine needs (bathing/dressing/grooming):

## 2025-03-04 ENCOUNTER — CARE COORDINATION (OUTPATIENT)
Dept: OTHER | Facility: CLINIC | Age: 55
End: 2025-03-04

## 2025-03-04 NOTE — CARE COORDINATION
Independent  Ability to drive and/or has transportation: Independent  Ability to do shopping: Independent  Ability to manage finances: Independent  Is patient able to live independently?: Yes     Current Housing: Apartment                 How wells does the patient now understand their health and well-being (symptoms, signs or risk factors) and what they need to do to manage their health?: Reasonable to good understanding and already engages in managing health or is willing to undertake better management   Suggested Interventions and Community Resources  No Identified Needs                   Medications Reviewed:   Completed during a previous call  and Completed during this call    Advance Care Planning:   Not reviewed during this call     Care Planning:      Goals        Establish PCP relationships and regularly scheduled appointments.      PCP - 1/14/25 1/20/25  PCP - attended appt 1/14/25 f/u TBD  Endo - TBD    2/7/25  PCP - attended appt 1/14/25 f/u TBD  Endo - attended appt 2/4/25 f/u TBD  Labs - 2/10/25  Pulmonary - attended appt 2/4/25 f/u TBD    3/4/25  PCP - attended appt 1/14/25 f/u TBD  Endo - attended appt 2/4/25 f/u May  Labs - May  Pulmonary - attended appt 2/4/25 f/u TBD       Knowledge and adherence to medication plan.      Taking prescribed meds       Supportive resources in place to maintain patient in the community (ie., home health, equipment, DME, refer to, etc.)      Nurse Access line 24/7 located on the back of the insurance card  TeleDoc # 1 - [phone] Debbie available   [facility] [place] [address] # [phone]  [facility], [place] # [phone] Monday - Sunday 8:00 AM - 8:00 PM  [facility], [facility] Address: [address] [place] [address] Phone: [phone]  [facility], [facility] Address: [address] [place] [address] Phone: [phone]  [facility], [facility]

## 2025-04-04 ENCOUNTER — CARE COORDINATION (OUTPATIENT)
Dept: OTHER | Facility: CLINIC | Age: 55
End: 2025-04-04

## 2025-04-04 NOTE — CARE COORDINATION
Ambulatory Care Coordination Note     4/4/2025 1:21 PM     Patient outreach attempt by this ACM today to perform care management follow up . ACM was unable to reach the patient by telephone today;   Pt answered the call, but hung up X 2. Unable to leave a V/M     ACM: Desirae Graham RN     Care Summary Note:   Pt was seen at [facility] Emergency Department 1/9/25  Diagnosis:  1. Hypertension, unspecified type    2. Nonintractable headache, unspecified chronicity pattern, unspecified headache type       PCP/Specialist follow up:       Follow Up:   Plan for next ACM outreach in approximately 2 weeks to complete:  - goal progression  - follow-up appointment with PCP .       Goals        Establish PCP relationships and regularly scheduled appointments.      PCP - 1/14/25 1/20/25  PCP - attended appt 1/14/25 f/u TBD  Endo - TBD    2/7/25  PCP - attended appt 1/14/25 f/u TBD  Endo - attended appt 2/4/25 f/u TBD  Labs - 2/10/25  Pulmonary - attended appt 2/4/25 f/u TBD    3/4/25  PCP - attended appt 1/14/25 f/u TBD  Endo - attended appt 2/4/25 f/u May  Labs - May  Pulmonary - attended appt 2/4/25 f/u TBD    4/4/24 Call placed to patient, no answer. Pt answered call, but hung up X 2. Unable to leave a V/M       Knowledge and adherence to medication plan.      Taking prescribed meds       Supportive resources in place to maintain patient in the community (ie., home health, equipment, DME, refer to, etc.)      Nurse Access line 24/7 located on the back of the insurance card  TeleDoc # 1 - [phone] Debbie available   [facility] [address] [place] [address], [facility] # [phone]  [facility], [place] # [phone] Monday - Sunday 8:00 AM - 8:00 PM  [facility], [place] Address: [address] [place] [address] Phone: [phone]  [facility] Address: [address], [place] [address] Phone: [phone] bon

## 2025-04-23 ENCOUNTER — CARE COORDINATION (OUTPATIENT)
Dept: OTHER | Facility: CLINIC | Age: 55
End: 2025-04-23

## 2025-04-23 RX ORDER — UBROGEPANT 50 MG/1
TABLET ORAL EVERY 6 HOURS PRN
COMMUNITY

## 2025-04-23 RX ORDER — LOSARTAN POTASSIUM 50 MG/1
50 TABLET ORAL DAILY
COMMUNITY

## 2025-04-23 NOTE — CARE COORDINATION
Ambulatory Care Coordination Note     2025 6:08 PM     Patient Current Location:  [facility] contacted the patient by telephone. Verified name and  with patient as identifiers.         ACM: Desirae Graham RN     Challenges to be reviewed by the provider   Additional needs identified to be addressed with provider Yes  Pt reported B/P has been running high and has noted an increase in H/A's               Method of communication with provider:  pt attended appt .    Utilization: Patient has not had any utilization since our last call.     Care Summary Note:   Pt was seen at [facility] Emergency Department 25  Diagnosis:  1. Hypertension, unspecified type    2. Nonintractable headache, unspecified chronicity pattern, unspecified headache type     Pt reported up until recently she had been doing well, but over the past couple of weeks pt reported B/P has started to run high again resulting in daily migraines. B/P - 140-150/. HTN and migraines meds changed at PCP appt last week. Pt reported she had her labs rechecked and noted thyroid levels are abnormal and was advised to f/u earlier with endo to discuss. Resources sent to pt as well regarding migraine prevention.     Offered patient enrollment in the Remote Patient Monitoring (RPM) program for in-home monitoring: Patient is not eligible for RPM program because: insurance coverage.     Assessments Completed:   Ambulatory Care Coordination Assessment    Care Coordination Protocol  Referral from Primary Care Provider: No  Week 1 - Initial Assessment     Do you have all of your prescriptions and are they filled?: Yes  Barriers to medication adherence: None  Are you able to afford your medications?: Yes  How often do you have trouble taking your medications the way you have been told to take them?: I always take them as prescribed.        Ability to seek help/take action for Emergent Urgent situations i.e. fire, crime, inclement weather

## 2025-05-14 ENCOUNTER — CARE COORDINATION (OUTPATIENT)
Dept: OTHER | Facility: CLINIC | Age: 55
End: 2025-05-14

## 2025-05-14 NOTE — CARE COORDINATION
Ambulatory Care Coordination Note     5/14/2025 5:29 PM     Patient outreach attempt by this ACM today to perform care management follow up . ACM was unable to reach the patient by telephone today;   left voice message requesting a return phone call to this ACM.     ACM: Desirae Graahm RN     Care Summary Note:   Pt was seen at [facility] Emergency Department 1/9/25  Diagnosis:  1. Hypertension, unspecified type    2. Nonintractable headache, unspecified chronicity pattern, unspecified headache type       PCP/Specialist follow up:       Follow Up:   Plan for next ACM outreach in approximately 2 weeks to complete:  - goal progression  - follow-up appointment with PCP, endo .       Goals        Establish PCP relationships and regularly scheduled appointments.      PCP - 1/14/25 1/20/25  PCP - attended appt 1/14/25 f/u TBD  Endo - TBD    2/7/25  PCP - attended appt 1/14/25 f/u TBD  Endo - attended appt 2/4/25 f/u TBD  Labs - 2/10/25  Pulmonary - attended appt 2/4/25 f/u TBD    3/4/25  PCP - attended appt 1/14/25 f/u TBD  Endo - attended appt 2/4/25 f/u May  Labs - May  Pulmonary - attended appt 2/4/25 f/u TBD    4/4/24 Call placed to patient, no answer. Pt answered call, but hung up X 2. Unable to leave a V/M    4/23/25  PCP - attended appt 4/15/25 f/u TBD  Endo - attended appt 2/4/25 f/u 5/13/25 or sooner  Labs - May  Pulmonary - attended appt 2/4/25 f/u TBD    5/14/25 Call placed to patient, no answer. Voicemail left to return call.          Knowledge and adherence to medication plan.      Taking prescribed meds       Supportive resources in place to maintain patient in the community (ie., home health, equipment, DME, refer to, etc.)      Nurse Access line 24/7 located on the back of the insurance card  TeleDoc # 1 - [phone] Debbie available   [facility] [address], [facility] # [phone]  [facility] # [phone] Monday - Sunday

## 2025-06-12 LAB
CHOLEST SERPL-MCNC: 197 MG/DL
GLUCOSE SERPL-MCNC: 110 MG/DL (ref 74–108)
HDLC SERPL-MCNC: 90 MG/DL (ref 40–60)
LDLC SERPL CALC-MCNC: 98 MG/DL (ref 0–100)
TRIGL SERPL-MCNC: 46 MG/DL (ref 0–150)

## 2025-06-19 ENCOUNTER — CARE COORDINATION (OUTPATIENT)
Dept: OTHER | Facility: CLINIC | Age: 55
End: 2025-06-19

## 2025-06-19 NOTE — CARE COORDINATION
Ambulatory Care Coordination Note     6/19/2025 4:35 PM     Patient outreach attempt by this ACM today to perform care management follow up . ACM was unable to reach the patient by telephone today;   left voice message requesting a return phone call to this ACM.  Pt reported now is not a good time and requested a call back tomorrow      ACM: Desirae Graham RN     Care Summary Note:   Pt was seen at [facility] Emergency Department 1/9/25  Diagnosis:  1. Hypertension, unspecified type    2. Nonintractable headache, unspecified chronicity pattern, unspecified headache type       PCP/Specialist follow up:       Follow Up:   Plan for next ACM outreach in approximately 1-2 days  to complete:  - goal progression  - follow-up appointment with PCP, endo.       Goals        Establish PCP relationships and regularly scheduled appointments.      PCP - 1/14/25 1/20/25  PCP - attended appt 1/14/25 f/u TBD  Endo - TBD    2/7/25  PCP - attended appt 1/14/25 f/u TBD  Endo - attended appt 2/4/25 f/u TBD  Labs - 2/10/25  Pulmonary - attended appt 2/4/25 f/u TBD    3/4/25  PCP - attended appt 1/14/25 f/u TBD  Endo - attended appt 2/4/25 f/u May  Labs - May  Pulmonary - attended appt 2/4/25 f/u TBD    4/4/24 Call placed to patient, no answer. Pt answered call, but hung up X 2. Unable to leave a V/M    4/23/25  PCP - attended appt 4/15/25 f/u TBD  Endo - attended appt 2/4/25 f/u 5/13/25 or sooner  Labs - May  Pulmonary - attended appt 2/4/25 f/u TBD    5/14/25 Call placed to patient, no answer. Voicemail left to return call.     6/19/25 pt reported now isn't a good time and requested a call back tomorrow        Knowledge and adherence to medication plan.      Taking prescribed meds       Supportive resources in place to maintain patient in the community (ie., home health, equipment, DME, refer to, etc.)      Nurse Access line 24/7 located on the back of the insurance card  [employer] # 1 - [phone] Debbie available   Good

## 2025-06-20 ENCOUNTER — CARE COORDINATION (OUTPATIENT)
Dept: OTHER | Facility: CLINIC | Age: 55
End: 2025-06-20

## 2025-06-20 RX ORDER — METHIMAZOLE 5 MG/1
5 TABLET ORAL DAILY
COMMUNITY

## 2025-06-20 NOTE — CARE COORDINATION
Ambulatory Care Coordination Note     2025 1:39 PM     Patient Current Location:  [facility] contacted the patient by telephone. Verified name and  with patient as identifiers.     Patient graduated from the High Risk Care Management program on 2025.  Patient verbalizes confidence in the ability to self-manage at this time..  Care management goals have been completed. No further Ambulatory Care Manager follow up scheduled.      ACM: Desirae Graham RN     Challenges to be reviewed by the provider   Additional needs identified to be addressed with provider No                 Method of communication with provider: none.    Utilization: Patient has not had any utilization since our last call.     Care Summary Note: Pt reported she is doing well in regards to her HTN and headaches. Pt reported she f/u with endo and was started on methimazole for Hyperthyroidism. B/P - . Pt reported no further issues with daily headaches. Denies further CM needs at this time. No questions or concerns for ACM at this time. Contact information provided for future reference.          Offered patient enrollment in the Remote Patient Monitoring (RPM) program for in-home monitoring: Patient is not eligible for RPM program because: insurance coverage.     Assessments Completed:   No changes since last call    Medications Reviewed:   Completed during a previous call  and Completed during this call    Advance Care Planning:   Not reviewed during this call     Care Planning:   Not completed during this call    PCP/Specialist follow up:       Follow Up:   No further Ambulatory Care Management follow-up scheduled at this time.  Patient  has Ambulatory Care Manager's contact information for any further questions, concerns or needs.      Desirae Graham RN, [facility] Associate Care Manager  [place] [address]  Cell [phone] Fax [phone]